# Patient Record
Sex: FEMALE | Race: WHITE | Employment: OTHER | URBAN - METROPOLITAN AREA
[De-identification: names, ages, dates, MRNs, and addresses within clinical notes are randomized per-mention and may not be internally consistent; named-entity substitution may affect disease eponyms.]

---

## 2017-01-01 ENCOUNTER — HOSPICE ADMISSION (OUTPATIENT)
Dept: HOSPICE | Facility: HOSPICE | Age: 82
End: 2017-01-01
Payer: MEDICARE

## 2017-01-01 ENCOUNTER — APPOINTMENT (OUTPATIENT)
Dept: GENERAL RADIOLOGY | Age: 82
DRG: 871 | End: 2017-01-01
Attending: INTERNAL MEDICINE
Payer: MEDICARE

## 2017-01-01 ENCOUNTER — APPOINTMENT (OUTPATIENT)
Dept: GENERAL RADIOLOGY | Age: 82
DRG: 871 | End: 2017-01-01
Attending: PHYSICIAN ASSISTANT
Payer: MEDICARE

## 2017-01-01 ENCOUNTER — APPOINTMENT (OUTPATIENT)
Dept: GENERAL RADIOLOGY | Age: 82
DRG: 871 | End: 2017-01-01
Attending: NURSE PRACTITIONER
Payer: MEDICARE

## 2017-01-01 ENCOUNTER — APPOINTMENT (OUTPATIENT)
Dept: ULTRASOUND IMAGING | Age: 82
DRG: 871 | End: 2017-01-01
Attending: NURSE PRACTITIONER
Payer: MEDICARE

## 2017-01-01 ENCOUNTER — HOSPITAL ENCOUNTER (INPATIENT)
Age: 82
LOS: 6 days | Discharge: HOSPICE | DRG: 871 | End: 2017-01-19
Attending: EMERGENCY MEDICINE | Admitting: INTERNAL MEDICINE
Payer: MEDICARE

## 2017-01-01 ENCOUNTER — HOSPITAL ENCOUNTER (INPATIENT)
Age: 82
LOS: 2 days | End: 2017-01-21
Attending: INTERNAL MEDICINE | Admitting: INTERNAL MEDICINE

## 2017-01-01 ENCOUNTER — APPOINTMENT (OUTPATIENT)
Dept: GENERAL RADIOLOGY | Age: 82
DRG: 871 | End: 2017-01-01
Attending: EMERGENCY MEDICINE
Payer: MEDICARE

## 2017-01-01 VITALS
OXYGEN SATURATION: 96 % | DIASTOLIC BLOOD PRESSURE: 69 MMHG | HEART RATE: 82 BPM | WEIGHT: 151.4 LBS | SYSTOLIC BLOOD PRESSURE: 136 MMHG | TEMPERATURE: 98.8 F | RESPIRATION RATE: 22 BRPM | BODY MASS INDEX: 25.85 KG/M2 | HEIGHT: 64 IN

## 2017-01-01 VITALS
SYSTOLIC BLOOD PRESSURE: 135 MMHG | HEART RATE: 103 BPM | RESPIRATION RATE: 19 BRPM | DIASTOLIC BLOOD PRESSURE: 90 MMHG | TEMPERATURE: 96.6 F

## 2017-01-01 DIAGNOSIS — N17.9 ACUTE RENAL FAILURE, UNSPECIFIED ACUTE RENAL FAILURE TYPE (HCC): ICD-10-CM

## 2017-01-01 DIAGNOSIS — R09.02 HYPOXEMIA: ICD-10-CM

## 2017-01-01 DIAGNOSIS — E87.0 HYPERNATREMIA: ICD-10-CM

## 2017-01-01 DIAGNOSIS — I82.4Y1 ACUTE DEEP VEIN THROMBOSIS (DVT) OF PROXIMAL VEIN OF RIGHT LOWER EXTREMITY (HCC): ICD-10-CM

## 2017-01-01 DIAGNOSIS — J18.9 CAP (COMMUNITY ACQUIRED PNEUMONIA): ICD-10-CM

## 2017-01-01 DIAGNOSIS — I95.9 HYPOTENSION, UNSPECIFIED HYPOTENSION TYPE: ICD-10-CM

## 2017-01-01 DIAGNOSIS — A41.01 SEPSIS DUE TO METHICILLIN SUSCEPTIBLE STAPHYLOCOCCUS AUREUS (HCC): ICD-10-CM

## 2017-01-01 DIAGNOSIS — N39.0 URINARY TRACT INFECTION WITHOUT HEMATURIA, SITE UNSPECIFIED: ICD-10-CM

## 2017-01-01 DIAGNOSIS — R78.81 BACTEREMIA DUE TO GRAM-POSITIVE BACTERIA: ICD-10-CM

## 2017-01-01 DIAGNOSIS — J96.02 ACUTE RESPIRATORY FAILURE WITH HYPOXIA AND HYPERCAPNIA (HCC): ICD-10-CM

## 2017-01-01 DIAGNOSIS — J96.01 ACUTE RESPIRATORY FAILURE WITH HYPOXIA AND HYPERCAPNIA (HCC): ICD-10-CM

## 2017-01-01 DIAGNOSIS — N19 RENAL FAILURE: ICD-10-CM

## 2017-01-01 DIAGNOSIS — J96.01 ACUTE RESPIRATORY FAILURE WITH HYPOXIA (HCC): ICD-10-CM

## 2017-01-01 DIAGNOSIS — J96.01 ACUTE HYPOXEMIC RESPIRATORY FAILURE (HCC): ICD-10-CM

## 2017-01-01 DIAGNOSIS — J96.00 ACUTE RESPIRATORY FAILURE, UNSPECIFIED WHETHER WITH HYPOXIA OR HYPERCAPNIA (HCC): ICD-10-CM

## 2017-01-01 DIAGNOSIS — D64.9 ANEMIA, UNSPECIFIED TYPE: ICD-10-CM

## 2017-01-01 DIAGNOSIS — G93.1 ANOXIC BRAIN INJURY (HCC): ICD-10-CM

## 2017-01-01 DIAGNOSIS — G93.40 ACUTE ENCEPHALOPATHY: ICD-10-CM

## 2017-01-01 DIAGNOSIS — A41.9 SEPTIC SHOCK (HCC): Primary | ICD-10-CM

## 2017-01-01 DIAGNOSIS — R65.21 SEPTIC SHOCK (HCC): Primary | ICD-10-CM

## 2017-01-01 DIAGNOSIS — N30.00 ACUTE CYSTITIS WITHOUT HEMATURIA: ICD-10-CM

## 2017-01-01 LAB
ABO + RH BLD: NORMAL
ALBUMIN SERPL BCP-MCNC: 1.5 G/DL (ref 3.2–4.6)
ALBUMIN SERPL BCP-MCNC: 1.5 G/DL (ref 3.2–4.6)
ALBUMIN SERPL BCP-MCNC: 1.7 G/DL (ref 3.2–4.6)
ALBUMIN SERPL BCP-MCNC: 2 G/DL (ref 3.2–4.6)
ALBUMIN SERPL BCP-MCNC: 2.6 G/DL (ref 3.2–4.6)
ALBUMIN/GLOB SERPL: 0.3 {RATIO} (ref 1.2–3.5)
ALBUMIN/GLOB SERPL: 0.3 {RATIO} (ref 1.2–3.5)
ALBUMIN/GLOB SERPL: 0.4 {RATIO} (ref 1.2–3.5)
ALBUMIN/GLOB SERPL: 0.5 {RATIO} (ref 1.2–3.5)
ALBUMIN/GLOB SERPL: 0.5 {RATIO} (ref 1.2–3.5)
ALP SERPL-CCNC: 107 U/L (ref 50–136)
ALP SERPL-CCNC: 38 U/L (ref 50–136)
ALP SERPL-CCNC: 59 U/L (ref 50–136)
ALP SERPL-CCNC: 64 U/L (ref 50–136)
ALP SERPL-CCNC: 98 U/L (ref 50–136)
ALT SERPL-CCNC: 21 U/L (ref 12–65)
ALT SERPL-CCNC: 24 U/L (ref 12–65)
ALT SERPL-CCNC: 27 U/L (ref 12–65)
ALT SERPL-CCNC: 28 U/L (ref 12–65)
ALT SERPL-CCNC: 29 U/L (ref 12–65)
ANION GAP BLD CALC-SCNC: 14 MMOL/L (ref 7–16)
ANION GAP BLD CALC-SCNC: 14 MMOL/L (ref 7–16)
ANION GAP BLD CALC-SCNC: 15 MMOL/L (ref 7–16)
ANION GAP BLD CALC-SCNC: 16 MMOL/L (ref 7–16)
ANION GAP BLD CALC-SCNC: 17 MMOL/L (ref 7–16)
APPEARANCE UR: ABNORMAL
ARTERIAL PATENCY WRIST A: ABNORMAL
ARTERIAL PATENCY WRIST A: POSITIVE
AST SERPL W P-5'-P-CCNC: 19 U/L (ref 15–37)
AST SERPL W P-5'-P-CCNC: 22 U/L (ref 15–37)
AST SERPL W P-5'-P-CCNC: 27 U/L (ref 15–37)
AST SERPL W P-5'-P-CCNC: 30 U/L (ref 15–37)
AST SERPL W P-5'-P-CCNC: 52 U/L (ref 15–37)
ATRIAL RATE: 140 BPM
BACTERIA SPEC CULT: NORMAL
BACTERIA URNS QL MICRO: ABNORMAL /HPF
BASE DEFICIT BLDA-SCNC: 11.1 MMOL/L (ref 0–2)
BASE DEFICIT BLDA-SCNC: 11.8 MMOL/L (ref 0–2)
BASE DEFICIT BLDA-SCNC: 5.3 MMOL/L (ref 0–2)
BASE DEFICIT BLDA-SCNC: 5.7 MMOL/L (ref 0–2)
BASE DEFICIT BLDA-SCNC: 7.1 MMOL/L (ref 0–2)
BASE DEFICIT BLDA-SCNC: 8.9 MMOL/L (ref 0–2)
BASE DEFICIT BLDA-SCNC: 9.8 MMOL/L (ref 0–2)
BASOPHILS # BLD AUTO: 0 K/UL (ref 0–0.2)
BASOPHILS # BLD: 0 % (ref 0–2)
BDY SITE: ABNORMAL
BILIRUB SERPL-MCNC: 0.3 MG/DL (ref 0.2–1.1)
BILIRUB SERPL-MCNC: 0.4 MG/DL (ref 0.2–1.1)
BILIRUB SERPL-MCNC: 0.5 MG/DL (ref 0.2–1.1)
BILIRUB UR QL: ABNORMAL
BLD PROD TYP BPU: NORMAL
BLOOD GROUP ANTIBODIES SERPL: NORMAL
BPU ID: NORMAL
BUN SERPL-MCNC: 53 MG/DL (ref 8–23)
BUN SERPL-MCNC: 64 MG/DL (ref 8–23)
BUN SERPL-MCNC: 64 MG/DL (ref 8–23)
BUN SERPL-MCNC: 67 MG/DL (ref 8–23)
BUN SERPL-MCNC: 70 MG/DL (ref 8–23)
BUN SERPL-MCNC: 75 MG/DL (ref 8–23)
BUN SERPL-MCNC: 79 MG/DL (ref 8–23)
CA-I BLD-SCNC: 1.23 MMOL/L (ref 1–1.3)
CA-I BLD-SCNC: 1.34 MMOL/L (ref 1–1.3)
CALCIUM SERPL-MCNC: 7.5 MG/DL (ref 8.3–10.4)
CALCIUM SERPL-MCNC: 7.6 MG/DL (ref 8.3–10.4)
CALCIUM SERPL-MCNC: 7.7 MG/DL (ref 8.3–10.4)
CALCIUM SERPL-MCNC: 8 MG/DL (ref 8.3–10.4)
CALCIUM SERPL-MCNC: 8 MG/DL (ref 8.3–10.4)
CALCIUM SERPL-MCNC: 8.2 MG/DL (ref 8.3–10.4)
CALCIUM SERPL-MCNC: 9.3 MG/DL (ref 8.3–10.4)
CALCULATED P AXIS, ECG09: 70 DEGREES
CALCULATED R AXIS, ECG10: -48 DEGREES
CALCULATED T AXIS, ECG11: 69 DEGREES
CASTS URNS QL MICRO: ABNORMAL /LPF
CHLORIDE BLDA-SCNC: 111 MMOL/L (ref 98–106)
CHLORIDE BLDA-SCNC: 113 MMOL/L (ref 98–106)
CHLORIDE SERPL-SCNC: 109 MMOL/L (ref 98–107)
CHLORIDE SERPL-SCNC: 110 MMOL/L (ref 98–107)
CHLORIDE SERPL-SCNC: 114 MMOL/L (ref 98–107)
CHLORIDE SERPL-SCNC: 116 MMOL/L (ref 98–107)
CHLORIDE SERPL-SCNC: 117 MMOL/L (ref 98–107)
CHLORIDE SERPL-SCNC: 121 MMOL/L (ref 98–107)
CHLORIDE SERPL-SCNC: 121 MMOL/L (ref 98–107)
CK SERPL-CCNC: 158 U/L (ref 21–215)
CO2 SERPL-SCNC: 14 MMOL/L (ref 21–32)
CO2 SERPL-SCNC: 17 MMOL/L (ref 21–32)
CO2 SERPL-SCNC: 17 MMOL/L (ref 21–32)
CO2 SERPL-SCNC: 18 MMOL/L (ref 21–32)
CO2 SERPL-SCNC: 19 MMOL/L (ref 21–32)
CO2 SERPL-SCNC: 19 MMOL/L (ref 21–32)
CO2 SERPL-SCNC: 20 MMOL/L (ref 21–32)
COHGB MFR BLD: 0.3 % (ref 0.5–1.5)
COHGB MFR BLD: 0.4 % (ref 0.5–1.5)
COHGB MFR BLD: 0.6 % (ref 0.5–1.5)
COHGB MFR BLD: 0.6 % (ref 0.5–1.5)
COHGB MFR BLD: 0.8 % (ref 0.5–1.5)
COLOR UR: YELLOW
CREAT SERPL-MCNC: 1.76 MG/DL (ref 0.6–1)
CREAT SERPL-MCNC: 2.07 MG/DL (ref 0.6–1)
CREAT SERPL-MCNC: 2.4 MG/DL (ref 0.6–1)
CREAT SERPL-MCNC: 2.52 MG/DL (ref 0.6–1)
CREAT SERPL-MCNC: 2.72 MG/DL (ref 0.6–1)
CREAT SERPL-MCNC: 2.93 MG/DL (ref 0.6–1)
CREAT SERPL-MCNC: 2.97 MG/DL (ref 0.6–1)
CROSSMATCH RESULT,%XM: NORMAL
DIAGNOSIS, 93000: NORMAL
DIASTOLIC BP, ECG02: NORMAL MMHG
DIFFERENTIAL METHOD BLD: ABNORMAL
DO-HGB BLD-MCNC: 1 % (ref 0–5)
DO-HGB BLD-MCNC: 1 % (ref 0–5)
DO-HGB BLD-MCNC: 10 % (ref 0–5)
DO-HGB BLD-MCNC: 2 % (ref 0–5)
DO-HGB BLD-MCNC: 6 % (ref 0–5)
EOSINOPHIL # BLD: 0 K/UL (ref 0–0.8)
EOSINOPHIL NFR BLD: 0 % (ref 0.5–7.8)
EPI CELLS #/AREA URNS HPF: ABNORMAL /HPF
ERYTHROCYTE [DISTWIDTH] IN BLOOD BY AUTOMATED COUNT: 15.7 % (ref 11.9–14.6)
ERYTHROCYTE [DISTWIDTH] IN BLOOD BY AUTOMATED COUNT: 15.7 % (ref 11.9–14.6)
ERYTHROCYTE [DISTWIDTH] IN BLOOD BY AUTOMATED COUNT: 15.8 % (ref 11.9–14.6)
ERYTHROCYTE [DISTWIDTH] IN BLOOD BY AUTOMATED COUNT: 17 % (ref 11.9–14.6)
ERYTHROCYTE [DISTWIDTH] IN BLOOD BY AUTOMATED COUNT: 17.1 % (ref 11.9–14.6)
ERYTHROCYTE [DISTWIDTH] IN BLOOD BY AUTOMATED COUNT: 17.1 % (ref 11.9–14.6)
FIO2 ON VENT: 65 %
GAS FLOW.O2 O2 DELIVERY SYS: 8 L/MIN
GLOBULIN SER CALC-MCNC: 4 G/DL (ref 2.3–3.5)
GLOBULIN SER CALC-MCNC: 4.1 G/DL (ref 2.3–3.5)
GLOBULIN SER CALC-MCNC: 4.3 G/DL (ref 2.3–3.5)
GLOBULIN SER CALC-MCNC: 4.6 G/DL (ref 2.3–3.5)
GLOBULIN SER CALC-MCNC: 4.9 G/DL (ref 2.3–3.5)
GLUCOSE SERPL-MCNC: 102 MG/DL (ref 65–100)
GLUCOSE SERPL-MCNC: 112 MG/DL (ref 65–100)
GLUCOSE SERPL-MCNC: 122 MG/DL (ref 65–100)
GLUCOSE SERPL-MCNC: 223 MG/DL (ref 65–100)
GLUCOSE SERPL-MCNC: 234 MG/DL (ref 65–100)
GLUCOSE SERPL-MCNC: 76 MG/DL (ref 65–100)
GLUCOSE SERPL-MCNC: 96 MG/DL (ref 65–100)
GLUCOSE UR STRIP.AUTO-MCNC: NEGATIVE MG/DL
GRAM STN SPEC: NORMAL
HCO3 BLDA-SCNC: 12 MMOL/L (ref 22–26)
HCO3 BLDA-SCNC: 14 MMOL/L (ref 22–26)
HCO3 BLDA-SCNC: 16 MMOL/L (ref 22–26)
HCO3 BLDA-SCNC: 17 MMOL/L (ref 22–26)
HCO3 BLDA-SCNC: 18 MMOL/L (ref 22–26)
HCT VFR BLD AUTO: 24.7 % (ref 35.8–46.3)
HCT VFR BLD AUTO: 27.1 % (ref 35.8–46.3)
HCT VFR BLD AUTO: 28.3 % (ref 35.8–46.3)
HCT VFR BLD AUTO: 29.8 % (ref 35.8–46.3)
HCT VFR BLD AUTO: 31.3 % (ref 35.8–46.3)
HCT VFR BLD AUTO: 34.3 % (ref 35.8–46.3)
HGB BLD-MCNC: 10.5 G/DL (ref 11.7–15.4)
HGB BLD-MCNC: 10.5 G/DL (ref 11.7–15.4)
HGB BLD-MCNC: 7.6 G/DL (ref 11.7–15.4)
HGB BLD-MCNC: 8.7 G/DL (ref 11.7–15.4)
HGB BLD-MCNC: 8.7 G/DL (ref 11.7–15.4)
HGB BLD-MCNC: 9.9 G/DL (ref 11.7–15.4)
HGB BLDMV-MCNC: 10.3 GM/DL (ref 11.7–15)
HGB BLDMV-MCNC: 10.5 GM/DL (ref 11.7–15)
HGB BLDMV-MCNC: 11.9 GM/DL (ref 11.7–15)
HGB BLDMV-MCNC: 13.2 GM/DL (ref 11.7–15)
HGB BLDMV-MCNC: 8 GM/DL (ref 11.7–15)
HGB BLDMV-MCNC: 8.8 GM/DL (ref 11.7–15)
HGB BLDMV-MCNC: 9.8 GM/DL (ref 11.7–15)
HGB UR QL STRIP: ABNORMAL
IMM GRANULOCYTES # BLD: 0.2 K/UL (ref 0–0.5)
IMM GRANULOCYTES NFR BLD AUTO: 0.9 % (ref 0–5)
INR PPP: 1 (ref 0.9–1.2)
KETONES UR QL STRIP.AUTO: ABNORMAL MG/DL
LACTATE SERPL-SCNC: 1.7 MMOL/L (ref 0.4–2)
LACTATE SERPL-SCNC: 2.2 MMOL/L (ref 0.4–2)
LACTATE SERPL-SCNC: 2.2 MMOL/L (ref 0.4–2)
LACTATE SERPL-SCNC: 4.2 MMOL/L (ref 0.4–2)
LEUKOCYTE ESTERASE UR QL STRIP.AUTO: NEGATIVE
LYMPHOCYTES # BLD AUTO: 5 % (ref 13–44)
LYMPHOCYTES # BLD AUTO: 5 % (ref 13–44)
LYMPHOCYTES # BLD AUTO: 6 % (ref 13–44)
LYMPHOCYTES # BLD: 0.8 K/UL (ref 0.5–4.6)
LYMPHOCYTES # BLD: 0.9 K/UL (ref 0.5–4.6)
LYMPHOCYTES # BLD: 1 K/UL (ref 0.5–4.6)
LYMPHOCYTES # BLD: 1.1 K/UL (ref 0.5–4.6)
LYMPHOCYTES # BLD: 1.4 K/UL (ref 0.5–4.6)
LYMPHOCYTES # BLD: 3.3 K/UL (ref 0.5–4.6)
LYMPHOCYTES NFR BLD MANUAL: 23 % (ref 16–44)
LYMPHOCYTES NFR BLD MANUAL: 7 % (ref 16–44)
LYMPHOCYTES NFR BLD MANUAL: 7 % (ref 16–44)
MAGNESIUM SERPL-MCNC: 2 MG/DL (ref 1.8–2.4)
MAGNESIUM SERPL-MCNC: 2.1 MG/DL (ref 1.8–2.4)
MAGNESIUM SERPL-MCNC: 2.1 MG/DL (ref 1.8–2.4)
MAGNESIUM SERPL-MCNC: 2.2 MG/DL (ref 1.8–2.4)
MAGNESIUM SERPL-MCNC: 2.3 MG/DL (ref 1.8–2.4)
MAGNESIUM SERPL-MCNC: 2.8 MG/DL (ref 1.8–2.4)
MCH RBC QN AUTO: 23.2 PG (ref 26.1–32.9)
MCH RBC QN AUTO: 23.5 PG (ref 26.1–32.9)
MCH RBC QN AUTO: 23.6 PG (ref 26.1–32.9)
MCH RBC QN AUTO: 24.6 PG (ref 26.1–32.9)
MCH RBC QN AUTO: 25 PG (ref 26.1–32.9)
MCH RBC QN AUTO: 25.1 PG (ref 26.1–32.9)
MCHC RBC AUTO-ENTMCNC: 30.6 G/DL (ref 31.4–35)
MCHC RBC AUTO-ENTMCNC: 30.7 G/DL (ref 31.4–35)
MCHC RBC AUTO-ENTMCNC: 30.8 G/DL (ref 31.4–35)
MCHC RBC AUTO-ENTMCNC: 32.1 G/DL (ref 31.4–35)
MCHC RBC AUTO-ENTMCNC: 33.2 G/DL (ref 31.4–35)
MCHC RBC AUTO-ENTMCNC: 33.5 G/DL (ref 31.4–35)
MCV RBC AUTO: 74.7 FL (ref 79.6–97.8)
MCV RBC AUTO: 75.3 FL (ref 79.6–97.8)
MCV RBC AUTO: 75.3 FL (ref 79.6–97.8)
MCV RBC AUTO: 76.5 FL (ref 79.6–97.8)
MCV RBC AUTO: 76.8 FL (ref 79.6–97.8)
MCV RBC AUTO: 77.1 FL (ref 79.6–97.8)
METAMYELOCYTES NFR BLD MANUAL: 6 %
METHGB MFR BLD: 0.1 % (ref 0–1.5)
METHGB MFR BLD: 0.3 % (ref 0–1.5)
METHGB MFR BLD: 0.4 % (ref 0–1.5)
METHGB MFR BLD: 0.4 % (ref 0–1.5)
METHGB MFR BLD: 0.5 % (ref 0–1.5)
METHGB MFR BLD: 0.5 % (ref 0–1.5)
METHGB MFR BLD: 0.6 % (ref 0–1.5)
MONOCYTES # BLD: 0.2 K/UL (ref 0.1–1.3)
MONOCYTES # BLD: 0.3 K/UL (ref 0.1–1.3)
MONOCYTES # BLD: 0.4 K/UL (ref 0.1–1.3)
MONOCYTES # BLD: 0.7 K/UL (ref 0.1–1.3)
MONOCYTES # BLD: 0.7 K/UL (ref 0.1–1.3)
MONOCYTES # BLD: 0.9 K/UL (ref 0.1–1.3)
MONOCYTES NFR BLD AUTO: 2 % (ref 4–12)
MONOCYTES NFR BLD AUTO: 4 % (ref 4–12)
MONOCYTES NFR BLD AUTO: 4 % (ref 4–12)
MONOCYTES NFR BLD MANUAL: 1 % (ref 3–9)
MONOCYTES NFR BLD MANUAL: 2 % (ref 3–9)
MONOCYTES NFR BLD MANUAL: 6 % (ref 3–9)
MYELOCYTES NFR BLD MANUAL: 3 %
NEUTS BAND NFR BLD MANUAL: 32 % (ref 0–10)
NEUTS BAND NFR BLD MANUAL: 8 % (ref 0–10)
NEUTS BAND NFR BLD MANUAL: 9 % (ref 0–10)
NEUTS SEG # BLD: 10.8 K/UL (ref 1.7–8.2)
NEUTS SEG # BLD: 13 K/UL (ref 1.7–8.2)
NEUTS SEG # BLD: 16.2 K/UL (ref 1.7–8.2)
NEUTS SEG # BLD: 16.5 K/UL (ref 1.7–8.2)
NEUTS SEG # BLD: 17.5 K/UL (ref 1.7–8.2)
NEUTS SEG # BLD: 18.4 K/UL (ref 1.7–8.2)
NEUTS SEG NFR BLD AUTO: 90 % (ref 43–78)
NEUTS SEG NFR BLD AUTO: 91 % (ref 43–78)
NEUTS SEG NFR BLD AUTO: 92 % (ref 43–78)
NEUTS SEG NFR BLD MANUAL: 57 % (ref 47–75)
NEUTS SEG NFR BLD MANUAL: 60 % (ref 47–75)
NEUTS SEG NFR BLD MANUAL: 78 % (ref 47–75)
NITRITE UR QL STRIP.AUTO: POSITIVE
OXYHGB MFR BLDA: 89.6 % (ref 94–97)
OXYHGB MFR BLDA: 92.9 % (ref 94–97)
OXYHGB MFR BLDA: 97.1 % (ref 94–97)
OXYHGB MFR BLDA: 97.2 % (ref 94–97)
OXYHGB MFR BLDA: 97.8 % (ref 94–97)
OXYHGB MFR BLDA: 98 % (ref 94–97)
OXYHGB MFR BLDA: 98.4 % (ref 94–97)
P-R INTERVAL, ECG05: 114 MS
PCO2 BLDA: 20 MMHG (ref 35–45)
PCO2 BLDA: 24 MMHG (ref 35–45)
PCO2 BLDA: 24 MMHG (ref 35–45)
PCO2 BLDA: 26 MMHG (ref 35–45)
PCO2 BLDA: 27 MMHG (ref 35–45)
PCO2 BLDA: 46 MMHG (ref 35–45)
PCO2 BLDA: 51 MMHG (ref 35–45)
PEEP RESPIRATORY: 8 CM[H2O]
PH BLDA: 7.14 [PH] (ref 7.35–7.45)
PH BLDA: 7.22 [PH] (ref 7.35–7.45)
PH BLDA: 7.38 [PH] (ref 7.35–7.45)
PH BLDA: 7.4 [PH] (ref 7.35–7.45)
PH BLDA: 7.43 [PH] (ref 7.35–7.45)
PH BLDA: 7.44 [PH] (ref 7.35–7.45)
PH BLDA: 7.44 [PH] (ref 7.35–7.45)
PH UR STRIP: 5 [PH] (ref 5–9)
PHOSPHATE SERPL-MCNC: 2 MG/DL (ref 2.3–3.7)
PHOSPHATE SERPL-MCNC: 2.1 MG/DL (ref 2.3–3.7)
PHOSPHATE SERPL-MCNC: 2.8 MG/DL (ref 2.3–3.7)
PHOSPHATE SERPL-MCNC: 2.8 MG/DL (ref 2.3–3.7)
PHOSPHATE SERPL-MCNC: 4.2 MG/DL (ref 2.3–3.7)
PLATELET # BLD AUTO: 132 K/UL (ref 150–450)
PLATELET # BLD AUTO: 159 K/UL (ref 150–450)
PLATELET # BLD AUTO: 229 K/UL (ref 150–450)
PLATELET # BLD AUTO: 254 K/UL (ref 150–450)
PLATELET # BLD AUTO: 282 K/UL (ref 150–450)
PLATELET # BLD AUTO: 370 K/UL (ref 150–450)
PLATELET COMMENTS,PCOM: ADEQUATE
PMV BLD AUTO: 11 FL (ref 10.8–14.1)
PMV BLD AUTO: 11 FL (ref 10.8–14.1)
PMV BLD AUTO: 11.4 FL (ref 10.8–14.1)
PMV BLD AUTO: 11.6 FL (ref 10.8–14.1)
PMV BLD AUTO: ABNORMAL FL (ref 10.8–14.1)
PMV BLD AUTO: ABNORMAL FL (ref 10.8–14.1)
PO2 BLDA: 106 MMHG (ref 75–100)
PO2 BLDA: 113 MMHG (ref 75–100)
PO2 BLDA: 129 MMHG (ref 75–100)
PO2 BLDA: 139 MMHG (ref 75–100)
PO2 BLDA: 151 MMHG (ref 75–100)
PO2 BLDA: 68 MMHG (ref 75–100)
PO2 BLDA: 78 MMHG (ref 75–100)
POTASSIUM BLDA-SCNC: 2.6 MMOL/L (ref 3.5–5.3)
POTASSIUM BLDA-SCNC: 4.41 MMOL/L (ref 3.5–5.3)
POTASSIUM SERPL-SCNC: 3 MMOL/L (ref 3.5–5.1)
POTASSIUM SERPL-SCNC: 3.1 MMOL/L (ref 3.5–5.1)
POTASSIUM SERPL-SCNC: 3.6 MMOL/L (ref 3.5–5.1)
POTASSIUM SERPL-SCNC: 3.9 MMOL/L (ref 3.5–5.1)
POTASSIUM SERPL-SCNC: 4.4 MMOL/L (ref 3.5–5.1)
PROCALCITONIN SERPL-MCNC: 6.3 NG/ML
PROMYELOCYTES NFR BLD MANUAL: 1 %
PROT SERPL-MCNC: 5.8 G/DL (ref 6.3–8.2)
PROT SERPL-MCNC: 5.8 G/DL (ref 6.3–8.2)
PROT SERPL-MCNC: 6 G/DL (ref 6.3–8.2)
PROT SERPL-MCNC: 6.1 G/DL (ref 6.3–8.2)
PROT SERPL-MCNC: 7.5 G/DL (ref 6.3–8.2)
PROT UR STRIP-MCNC: 100 MG/DL
PROTHROMBIN TIME: 10.7 SEC (ref 9.6–12)
Q-T INTERVAL, ECG07: 298 MS
QRS DURATION, ECG06: 88 MS
QTC CALCULATION (BEZET), ECG08: 454 MS
RBC # BLD AUTO: 3.28 M/UL (ref 4.05–5.25)
RBC # BLD AUTO: 3.53 M/UL (ref 4.05–5.25)
RBC # BLD AUTO: 3.7 M/UL (ref 4.05–5.25)
RBC # BLD AUTO: 3.96 M/UL (ref 4.05–5.25)
RBC # BLD AUTO: 4.19 M/UL (ref 4.05–5.25)
RBC # BLD AUTO: 4.45 M/UL (ref 4.05–5.25)
RBC #/AREA URNS HPF: ABNORMAL /HPF
RBC MORPH BLD: ABNORMAL
RESP RATE: 12
RESP RATE: 15
RESP RATE: 20
SAO2 % BLD: 90 % (ref 92–98.5)
SAO2 % BLD: 94 % (ref 92–98.5)
SAO2 % BLD: 98 % (ref 92–98.5)
SAO2 % BLD: 98 % (ref 92–98.5)
SAO2 % BLD: 99 % (ref 92–98.5)
SERVICE CMNT-IMP: ABNORMAL
SERVICE CMNT-IMP: NORMAL
SODIUM BLDA-SCNC: 140.2 MMOL/L (ref 135–148)
SODIUM BLDA-SCNC: 150.1 MMOL/L (ref 135–148)
SODIUM SERPL-SCNC: 142 MMOL/L (ref 136–145)
SODIUM SERPL-SCNC: 144 MMOL/L (ref 136–145)
SODIUM SERPL-SCNC: 148 MMOL/L (ref 136–145)
SODIUM SERPL-SCNC: 149 MMOL/L (ref 136–145)
SODIUM SERPL-SCNC: 150 MMOL/L (ref 136–145)
SODIUM SERPL-SCNC: 152 MMOL/L (ref 136–145)
SODIUM SERPL-SCNC: 153 MMOL/L (ref 136–145)
SP GR UR REFRACTOMETRY: 1.02 (ref 1–1.02)
SPECIMEN EXP DATE BLD: NORMAL
STATUS OF UNIT,%ST: NORMAL
SYSTOLIC BP, ECG01: NORMAL MMHG
TROPONIN I BLD-MCNC: 0.07 NG/ML (ref 0–0.08)
UNIT DIVISION, %UDIV: 0
UROBILINOGEN UR QL STRIP.AUTO: 0.2 EU/DL (ref 0.2–1)
VANCOMYCIN SERPL-MCNC: 12 UG/ML
VANCOMYCIN SERPL-MCNC: 7.5 UG/ML
VENTILATION MODE VENT: ABNORMAL
VENTRICULAR RATE, ECG03: 140 BPM
VT SETTING VENT: 400 ML
VT SETTING VENT: 523 ML
WBC # BLD AUTO: 14.4 K/UL (ref 4.3–11.1)
WBC # BLD AUTO: 14.9 K/UL (ref 4.3–11.1)
WBC # BLD AUTO: 17.9 K/UL (ref 4.3–11.1)
WBC # BLD AUTO: 18.1 K/UL (ref 4.3–11.1)
WBC # BLD AUTO: 19 K/UL (ref 4.3–11.1)
WBC # BLD AUTO: 20 K/UL (ref 4.3–11.1)
WBC MORPH BLD: ABNORMAL
WBC URNS QL MICRO: ABNORMAL /HPF

## 2017-01-01 PROCEDURE — 80053 COMPREHEN METABOLIC PANEL: CPT | Performed by: NURSE PRACTITIONER

## 2017-01-01 PROCEDURE — 77030019605

## 2017-01-01 PROCEDURE — 84145 PROCALCITONIN (PCT): CPT | Performed by: EMERGENCY MEDICINE

## 2017-01-01 PROCEDURE — 74011000302 HC RX REV CODE- 302: Performed by: INTERNAL MEDICINE

## 2017-01-01 PROCEDURE — 31500 INSERT EMERGENCY AIRWAY: CPT | Performed by: EMERGENCY MEDICINE

## 2017-01-01 PROCEDURE — 83735 ASSAY OF MAGNESIUM: CPT | Performed by: NURSE PRACTITIONER

## 2017-01-01 PROCEDURE — 74011250636 HC RX REV CODE- 250/636: Performed by: INTERNAL MEDICINE

## 2017-01-01 PROCEDURE — 85025 COMPLETE CBC W/AUTO DIFF WBC: CPT | Performed by: NURSE PRACTITIONER

## 2017-01-01 PROCEDURE — 65270000029 HC RM PRIVATE

## 2017-01-01 PROCEDURE — 83605 ASSAY OF LACTIC ACID: CPT | Performed by: INTERNAL MEDICINE

## 2017-01-01 PROCEDURE — 99233 SBSQ HOSP IP/OBS HIGH 50: CPT | Performed by: INTERNAL MEDICINE

## 2017-01-01 PROCEDURE — 82803 BLOOD GASES ANY COMBINATION: CPT

## 2017-01-01 PROCEDURE — 87086 URINE CULTURE/COLONY COUNT: CPT | Performed by: EMERGENCY MEDICINE

## 2017-01-01 PROCEDURE — 0BH17EZ INSERTION OF ENDOTRACHEAL AIRWAY INTO TRACHEA, VIA NATURAL OR ARTIFICIAL OPENING: ICD-10-PCS | Performed by: EMERGENCY MEDICINE

## 2017-01-01 PROCEDURE — 74011000258 HC RX REV CODE- 258: Performed by: INTERNAL MEDICINE

## 2017-01-01 PROCEDURE — 99232 SBSQ HOSP IP/OBS MODERATE 35: CPT | Performed by: INTERNAL MEDICINE

## 2017-01-01 PROCEDURE — 74011250636 HC RX REV CODE- 250/636: Performed by: NURSE PRACTITIONER

## 2017-01-01 PROCEDURE — 86923 COMPATIBILITY TEST ELECTRIC: CPT | Performed by: INTERNAL MEDICINE

## 2017-01-01 PROCEDURE — 71010 XR CHEST PORT: CPT

## 2017-01-01 PROCEDURE — 36415 COLL VENOUS BLD VENIPUNCTURE: CPT | Performed by: NURSE PRACTITIONER

## 2017-01-01 PROCEDURE — 65620000000 HC RM CCU GENERAL

## 2017-01-01 PROCEDURE — 87088 URINE BACTERIA CULTURE: CPT | Performed by: EMERGENCY MEDICINE

## 2017-01-01 PROCEDURE — 5A1945Z RESPIRATORY VENTILATION, 24-96 CONSECUTIVE HOURS: ICD-10-PCS | Performed by: EMERGENCY MEDICINE

## 2017-01-01 PROCEDURE — 87186 SC STD MICRODIL/AGAR DIL: CPT | Performed by: NURSE PRACTITIONER

## 2017-01-01 PROCEDURE — 74011000250 HC RX REV CODE- 250: Performed by: INTERNAL MEDICINE

## 2017-01-01 PROCEDURE — 85025 COMPLETE CBC W/AUTO DIFF WBC: CPT | Performed by: INTERNAL MEDICINE

## 2017-01-01 PROCEDURE — 36600 WITHDRAWAL OF ARTERIAL BLOOD: CPT

## 2017-01-01 PROCEDURE — 87205 SMEAR GRAM STAIN: CPT | Performed by: NURSE PRACTITIONER

## 2017-01-01 PROCEDURE — 0656 HSPC GENERAL INPATIENT

## 2017-01-01 PROCEDURE — 85025 COMPLETE CBC W/AUTO DIFF WBC: CPT | Performed by: EMERGENCY MEDICINE

## 2017-01-01 PROCEDURE — 84100 ASSAY OF PHOSPHORUS: CPT | Performed by: NURSE PRACTITIONER

## 2017-01-01 PROCEDURE — 76450000000

## 2017-01-01 PROCEDURE — 80053 COMPREHEN METABOLIC PANEL: CPT | Performed by: EMERGENCY MEDICINE

## 2017-01-01 PROCEDURE — 96375 TX/PRO/DX INJ NEW DRUG ADDON: CPT | Performed by: EMERGENCY MEDICINE

## 2017-01-01 PROCEDURE — 99222 1ST HOSP IP/OBS MODERATE 55: CPT | Performed by: INTERNAL MEDICINE

## 2017-01-01 PROCEDURE — 87641 MR-STAPH DNA AMP PROBE: CPT | Performed by: INTERNAL MEDICINE

## 2017-01-01 PROCEDURE — 94003 VENT MGMT INPAT SUBQ DAY: CPT

## 2017-01-01 PROCEDURE — 96367 TX/PROPH/DG ADDL SEQ IV INF: CPT | Performed by: EMERGENCY MEDICINE

## 2017-01-01 PROCEDURE — 99239 HOSP IP/OBS DSCHRG MGMT >30: CPT | Performed by: INTERNAL MEDICINE

## 2017-01-01 PROCEDURE — 99285 EMERGENCY DEPT VISIT HI MDM: CPT | Performed by: EMERGENCY MEDICINE

## 2017-01-01 PROCEDURE — 94760 N-INVAS EAR/PLS OXIMETRY 1: CPT

## 2017-01-01 PROCEDURE — 86580 TB INTRADERMAL TEST: CPT | Performed by: INTERNAL MEDICINE

## 2017-01-01 PROCEDURE — 93005 ELECTROCARDIOGRAM TRACING: CPT | Performed by: EMERGENCY MEDICINE

## 2017-01-01 PROCEDURE — C9113 INJ PANTOPRAZOLE SODIUM, VIA: HCPCS | Performed by: INTERNAL MEDICINE

## 2017-01-01 PROCEDURE — 74011000250 HC RX REV CODE- 250: Performed by: NURSE PRACTITIONER

## 2017-01-01 PROCEDURE — 74011250637 HC RX REV CODE- 250/637: Performed by: NURSE PRACTITIONER

## 2017-01-01 PROCEDURE — 94002 VENT MGMT INPAT INIT DAY: CPT

## 2017-01-01 PROCEDURE — L4396 STATIC OR DYNAMI AFO PRE CST: HCPCS

## 2017-01-01 PROCEDURE — 96368 THER/DIAG CONCURRENT INF: CPT | Performed by: EMERGENCY MEDICINE

## 2017-01-01 PROCEDURE — 87186 SC STD MICRODIL/AGAR DIL: CPT | Performed by: EMERGENCY MEDICINE

## 2017-01-01 PROCEDURE — 96366 THER/PROPH/DIAG IV INF ADDON: CPT | Performed by: EMERGENCY MEDICINE

## 2017-01-01 PROCEDURE — 81001 URINALYSIS AUTO W/SCOPE: CPT | Performed by: EMERGENCY MEDICINE

## 2017-01-01 PROCEDURE — 80048 BASIC METABOLIC PNL TOTAL CA: CPT | Performed by: INTERNAL MEDICINE

## 2017-01-01 PROCEDURE — 87804 INFLUENZA ASSAY W/OPTIC: CPT | Performed by: EMERGENCY MEDICINE

## 2017-01-01 PROCEDURE — 83735 ASSAY OF MAGNESIUM: CPT | Performed by: EMERGENCY MEDICINE

## 2017-01-01 PROCEDURE — 84484 ASSAY OF TROPONIN QUANT: CPT

## 2017-01-01 PROCEDURE — 96365 THER/PROPH/DIAG IV INF INIT: CPT | Performed by: EMERGENCY MEDICINE

## 2017-01-01 PROCEDURE — 87040 BLOOD CULTURE FOR BACTERIA: CPT | Performed by: INTERNAL MEDICINE

## 2017-01-01 PROCEDURE — 74011250636 HC RX REV CODE- 250/636

## 2017-01-01 PROCEDURE — 82550 ASSAY OF CK (CPK): CPT | Performed by: EMERGENCY MEDICINE

## 2017-01-01 PROCEDURE — 80048 BASIC METABOLIC PNL TOTAL CA: CPT | Performed by: NURSE PRACTITIONER

## 2017-01-01 PROCEDURE — 80202 ASSAY OF VANCOMYCIN: CPT | Performed by: NURSE PRACTITIONER

## 2017-01-01 PROCEDURE — 83605 ASSAY OF LACTIC ACID: CPT | Performed by: NURSE PRACTITIONER

## 2017-01-01 PROCEDURE — 99223 1ST HOSP IP/OBS HIGH 75: CPT | Performed by: INTERNAL MEDICINE

## 2017-01-01 PROCEDURE — 87040 BLOOD CULTURE FOR BACTERIA: CPT | Performed by: NURSE PRACTITIONER

## 2017-01-01 PROCEDURE — 77030013131 HC IV BLD ST ICUM -A

## 2017-01-01 PROCEDURE — 74011250637 HC RX REV CODE- 250/637: Performed by: INTERNAL MEDICINE

## 2017-01-01 PROCEDURE — 93970 EXTREMITY STUDY: CPT

## 2017-01-01 PROCEDURE — P9016 RBC LEUKOCYTES REDUCED: HCPCS | Performed by: INTERNAL MEDICINE

## 2017-01-01 PROCEDURE — 3336500001 HSPC ELECTION

## 2017-01-01 PROCEDURE — 74011000258 HC RX REV CODE- 258: Performed by: EMERGENCY MEDICINE

## 2017-01-01 PROCEDURE — 36430 TRANSFUSION BLD/BLD COMPNT: CPT

## 2017-01-01 PROCEDURE — 74011250636 HC RX REV CODE- 250/636: Performed by: EMERGENCY MEDICINE

## 2017-01-01 PROCEDURE — 87641 MR-STAPH DNA AMP PROBE: CPT | Performed by: NURSE PRACTITIONER

## 2017-01-01 PROCEDURE — 77030008771 HC TU NG SALEM SUMP -A

## 2017-01-01 PROCEDURE — 74011000250 HC RX REV CODE- 250: Performed by: EMERGENCY MEDICINE

## 2017-01-01 PROCEDURE — 87040 BLOOD CULTURE FOR BACTERIA: CPT | Performed by: EMERGENCY MEDICINE

## 2017-01-01 PROCEDURE — 87070 CULTURE OTHR SPECIMN AEROBIC: CPT | Performed by: NURSE PRACTITIONER

## 2017-01-01 PROCEDURE — 74011000258 HC RX REV CODE- 258: Performed by: NURSE PRACTITIONER

## 2017-01-01 PROCEDURE — 77010033678 HC OXYGEN DAILY

## 2017-01-01 PROCEDURE — 86900 BLOOD TYPING SEROLOGIC ABO: CPT | Performed by: INTERNAL MEDICINE

## 2017-01-01 PROCEDURE — 80202 ASSAY OF VANCOMYCIN: CPT | Performed by: INTERNAL MEDICINE

## 2017-01-01 PROCEDURE — 85610 PROTHROMBIN TIME: CPT | Performed by: EMERGENCY MEDICINE

## 2017-01-01 PROCEDURE — 87077 CULTURE AEROBIC IDENTIFY: CPT | Performed by: NURSE PRACTITIONER

## 2017-01-01 RX ORDER — HYOSCYAMINE SULFATE 0.12 MG/ML
250 LIQUID ORAL
Status: DISCONTINUED | OUTPATIENT
Start: 2017-01-01 | End: 2017-01-01 | Stop reason: HOSPADM

## 2017-01-01 RX ORDER — LOPERAMIDE HYDROCHLORIDE 2 MG/1
4 CAPSULE ORAL AS NEEDED
Status: DISCONTINUED | OUTPATIENT
Start: 2017-01-01 | End: 2017-01-01 | Stop reason: HOSPADM

## 2017-01-01 RX ORDER — DEXTROSE MONOHYDRATE 50 MG/ML
50 INJECTION, SOLUTION INTRAVENOUS CONTINUOUS
Status: DISCONTINUED | OUTPATIENT
Start: 2017-01-01 | End: 2017-01-01

## 2017-01-01 RX ORDER — SODIUM CHLORIDE 450 MG/100ML
50 INJECTION, SOLUTION INTRAVENOUS CONTINUOUS
Status: DISCONTINUED | OUTPATIENT
Start: 2017-01-01 | End: 2017-01-01

## 2017-01-01 RX ORDER — SODIUM CHLORIDE 0.9 % (FLUSH) 0.9 %
5-10 SYRINGE (ML) INJECTION EVERY 8 HOURS
Status: DISCONTINUED | OUTPATIENT
Start: 2017-01-01 | End: 2017-01-01

## 2017-01-01 RX ORDER — LIDOCAINE HYDROCHLORIDE 20 MG/ML
5 SOLUTION OROPHARYNGEAL
Status: DISCONTINUED | OUTPATIENT
Start: 2017-01-01 | End: 2017-01-01 | Stop reason: HOSPADM

## 2017-01-01 RX ORDER — SODIUM CHLORIDE 0.9 % (FLUSH) 0.9 %
5-10 SYRINGE (ML) INJECTION AS NEEDED
Status: DISCONTINUED | OUTPATIENT
Start: 2017-01-01 | End: 2017-01-01 | Stop reason: HOSPADM

## 2017-01-01 RX ORDER — HYDROCORTISONE SODIUM SUCCINATE 100 MG/2ML
50 INJECTION, POWDER, FOR SOLUTION INTRAMUSCULAR; INTRAVENOUS EVERY 12 HOURS
Status: DISCONTINUED | OUTPATIENT
Start: 2017-01-01 | End: 2017-01-01

## 2017-01-01 RX ORDER — ENOXAPARIN SODIUM 100 MG/ML
40 INJECTION SUBCUTANEOUS EVERY 24 HOURS
Status: DISCONTINUED | OUTPATIENT
Start: 2017-01-01 | End: 2017-01-01

## 2017-01-01 RX ORDER — ACETAMINOPHEN 325 MG/1
650 TABLET ORAL
Status: DISCONTINUED | OUTPATIENT
Start: 2017-01-01 | End: 2017-01-01 | Stop reason: HOSPADM

## 2017-01-01 RX ORDER — SENNOSIDES 8.6 MG/1
1 TABLET ORAL 2 TIMES DAILY
Status: DISCONTINUED | OUTPATIENT
Start: 2017-01-01 | End: 2017-01-01 | Stop reason: HOSPADM

## 2017-01-01 RX ORDER — HYDROCORTISONE SODIUM SUCCINATE 100 MG/2ML
50 INJECTION, POWDER, FOR SOLUTION INTRAMUSCULAR; INTRAVENOUS EVERY 8 HOURS
Status: DISCONTINUED | OUTPATIENT
Start: 2017-01-01 | End: 2017-01-01

## 2017-01-01 RX ORDER — PROPOFOL 10 MG/ML
5-50 VIAL (ML) INTRAVENOUS
Status: DISCONTINUED | OUTPATIENT
Start: 2017-01-01 | End: 2017-01-01

## 2017-01-01 RX ORDER — POTASSIUM CHLORIDE 14.9 MG/ML
20 INJECTION INTRAVENOUS
Status: COMPLETED | OUTPATIENT
Start: 2017-01-01 | End: 2017-01-01

## 2017-01-01 RX ORDER — MORPHINE SULFATE 100 MG/5ML
10 SOLUTION ORAL
Status: DISCONTINUED | OUTPATIENT
Start: 2017-01-01 | End: 2017-01-01 | Stop reason: HOSPADM

## 2017-01-01 RX ORDER — MORPHINE SULFATE 2 MG/ML
2 INJECTION, SOLUTION INTRAMUSCULAR; INTRAVENOUS
Status: COMPLETED | OUTPATIENT
Start: 2017-01-01 | End: 2017-01-01

## 2017-01-01 RX ORDER — ACETAMINOPHEN 650 MG/1
650 SUPPOSITORY RECTAL
Status: DISCONTINUED | OUTPATIENT
Start: 2017-01-01 | End: 2017-01-01 | Stop reason: HOSPADM

## 2017-01-01 RX ORDER — SUCCINYLCHOLINE CHLORIDE 20 MG/ML
40 INJECTION INTRAMUSCULAR; INTRAVENOUS
Status: COMPLETED | OUTPATIENT
Start: 2017-01-01 | End: 2017-01-01

## 2017-01-01 RX ORDER — GLYCOPYRROLATE 0.2 MG/ML
0.2 INJECTION INTRAMUSCULAR; INTRAVENOUS
Status: DISCONTINUED | OUTPATIENT
Start: 2017-01-01 | End: 2017-01-01 | Stop reason: HOSPADM

## 2017-01-01 RX ORDER — MORPHINE SULFATE 10 MG/ML
4 INJECTION, SOLUTION INTRAMUSCULAR; INTRAVENOUS
Status: DISCONTINUED | OUTPATIENT
Start: 2017-01-01 | End: 2017-01-01 | Stop reason: HOSPADM

## 2017-01-01 RX ORDER — SODIUM BICARBONATE 1 MEQ/ML
100 SYRINGE (ML) INTRAVENOUS ONCE
Status: COMPLETED | OUTPATIENT
Start: 2017-01-01 | End: 2017-01-01

## 2017-01-01 RX ORDER — HYDROCORTISONE SODIUM SUCCINATE 100 MG/2ML
50 INJECTION, POWDER, FOR SOLUTION INTRAMUSCULAR; INTRAVENOUS
Status: COMPLETED | OUTPATIENT
Start: 2017-01-01 | End: 2017-01-01

## 2017-01-01 RX ORDER — LORAZEPAM 2 MG/ML
2 INJECTION INTRAMUSCULAR
Status: DISCONTINUED | OUTPATIENT
Start: 2017-01-01 | End: 2017-01-01 | Stop reason: HOSPADM

## 2017-01-01 RX ORDER — SODIUM BICARBONATE 1 MEQ/ML
50 SYRINGE (ML) INTRAVENOUS
Status: COMPLETED | OUTPATIENT
Start: 2017-01-01 | End: 2017-01-01

## 2017-01-01 RX ORDER — MORPHINE SULFATE 4 MG/ML
4 INJECTION, SOLUTION INTRAMUSCULAR; INTRAVENOUS
Status: DISCONTINUED | OUTPATIENT
Start: 2017-01-01 | End: 2017-01-01 | Stop reason: HOSPADM

## 2017-01-01 RX ORDER — SODIUM CHLORIDE 9 MG/ML
100 INJECTION, SOLUTION INTRAVENOUS CONTINUOUS
Status: DISCONTINUED | OUTPATIENT
Start: 2017-01-01 | End: 2017-01-01

## 2017-01-01 RX ORDER — POTASSIUM CHLORIDE 14.9 MG/ML
20 INJECTION INTRAVENOUS
Status: DISCONTINUED | OUTPATIENT
Start: 2017-01-01 | End: 2017-01-01

## 2017-01-01 RX ORDER — LORAZEPAM 2 MG/ML
0.5 INJECTION INTRAMUSCULAR
Status: DISCONTINUED | OUTPATIENT
Start: 2017-01-01 | End: 2017-01-01 | Stop reason: HOSPADM

## 2017-01-01 RX ORDER — ETOMIDATE 2 MG/ML
10 INJECTION INTRAVENOUS ONCE
Status: COMPLETED | OUTPATIENT
Start: 2017-01-01 | End: 2017-01-01

## 2017-01-01 RX ORDER — HYDROCORTISONE SODIUM SUCCINATE 100 MG/2ML
25 INJECTION, POWDER, FOR SOLUTION INTRAMUSCULAR; INTRAVENOUS EVERY 12 HOURS
Status: DISCONTINUED | OUTPATIENT
Start: 2017-01-01 | End: 2017-01-01

## 2017-01-01 RX ORDER — SODIUM CHLORIDE 9 MG/ML
250 INJECTION, SOLUTION INTRAVENOUS AS NEEDED
Status: DISCONTINUED | OUTPATIENT
Start: 2017-01-01 | End: 2017-01-01 | Stop reason: HOSPADM

## 2017-01-01 RX ORDER — CEFAZOLIN SODIUM IN 0.9 % NACL 2 G/50 ML
2 INTRAVENOUS SOLUTION, PIGGYBACK (ML) INTRAVENOUS EVERY 12 HOURS
Status: DISCONTINUED | OUTPATIENT
Start: 2017-01-01 | End: 2017-01-01

## 2017-01-01 RX ORDER — PROPOFOL 10 MG/ML
INJECTION, EMULSION INTRAVENOUS
Status: COMPLETED
Start: 2017-01-01 | End: 2017-01-01

## 2017-01-01 RX ORDER — SODIUM CHLORIDE 0.9 % (FLUSH) 0.9 %
3 SYRINGE (ML) INJECTION AS NEEDED
Status: DISCONTINUED | OUTPATIENT
Start: 2017-01-01 | End: 2017-01-01 | Stop reason: HOSPADM

## 2017-01-01 RX ORDER — PANTOPRAZOLE SODIUM 40 MG/1
40 TABLET, DELAYED RELEASE ORAL
Status: DISCONTINUED | OUTPATIENT
Start: 2017-01-01 | End: 2017-01-01

## 2017-01-01 RX ORDER — SODIUM CHLORIDE 450 MG/100ML
125 INJECTION, SOLUTION INTRAVENOUS CONTINUOUS
Status: DISCONTINUED | OUTPATIENT
Start: 2017-01-01 | End: 2017-01-01

## 2017-01-01 RX ORDER — ENOXAPARIN SODIUM 100 MG/ML
60 INJECTION SUBCUTANEOUS EVERY 24 HOURS
Status: DISCONTINUED | OUTPATIENT
Start: 2017-01-01 | End: 2017-01-01

## 2017-01-01 RX ADMIN — MORPHINE SULFATE 4 MG: 4 INJECTION, SOLUTION INTRAMUSCULAR; INTRAVENOUS at 09:34

## 2017-01-01 RX ADMIN — Medication 10 ML: at 22:18

## 2017-01-01 RX ADMIN — PIPERACILLIN AND TAZOBACTAM 3.38 G: 3; .375 INJECTION, POWDER, FOR SOLUTION INTRAVENOUS at 05:43

## 2017-01-01 RX ADMIN — Medication 10 ML: at 21:18

## 2017-01-01 RX ADMIN — TUBERCULIN PURIFIED PROTEIN DERIVATIVE 5 UNITS: 5 INJECTION, SOLUTION INTRADERMAL at 17:24

## 2017-01-01 RX ADMIN — DEXTROSE MONOHYDRATE 125 ML/HR: 5 INJECTION, SOLUTION INTRAVENOUS at 11:59

## 2017-01-01 RX ADMIN — MORPHINE SULFATE 2 MG: 2 INJECTION, SOLUTION INTRAMUSCULAR; INTRAVENOUS at 04:04

## 2017-01-01 RX ADMIN — ENOXAPARIN SODIUM 60 MG: 60 INJECTION SUBCUTANEOUS at 23:27

## 2017-01-01 RX ADMIN — Medication 10 ML: at 06:07

## 2017-01-01 RX ADMIN — LORAZEPAM 0.5 MG: 2 INJECTION INTRAMUSCULAR; INTRAVENOUS at 04:14

## 2017-01-01 RX ADMIN — SODIUM CHLORIDE 40 MG: 9 INJECTION INTRAMUSCULAR; INTRAVENOUS; SUBCUTANEOUS at 09:36

## 2017-01-01 RX ADMIN — LIDOCAINE HYDROCHLORIDE 5 ML: 20 SOLUTION ORAL; TOPICAL at 09:33

## 2017-01-01 RX ADMIN — PIPERACILLIN AND TAZOBACTAM 3.38 G: 3; .375 INJECTION, POWDER, FOR SOLUTION INTRAVENOUS at 06:07

## 2017-01-01 RX ADMIN — HYDROCORTISONE SODIUM SUCCINATE 50 MG: 100 INJECTION, POWDER, FOR SOLUTION INTRAMUSCULAR; INTRAVENOUS at 17:46

## 2017-01-01 RX ADMIN — Medication 10 ML: at 14:00

## 2017-01-01 RX ADMIN — POTASSIUM CHLORIDE 20 MEQ: 14.9 INJECTION, SOLUTION INTRAVENOUS at 05:10

## 2017-01-01 RX ADMIN — MORPHINE SULFATE 10 MG: 100 SOLUTION ORAL at 20:31

## 2017-01-01 RX ADMIN — SODIUM CHLORIDE 100 ML/HR: 900 INJECTION, SOLUTION INTRAVENOUS at 20:06

## 2017-01-01 RX ADMIN — Medication 10 ML: at 06:12

## 2017-01-01 RX ADMIN — Medication 10 ML: at 21:50

## 2017-01-01 RX ADMIN — DEXTROSE MONOHYDRATE 125 ML/HR: 5 INJECTION, SOLUTION INTRAVENOUS at 12:00

## 2017-01-01 RX ADMIN — Medication 10 ML: at 21:24

## 2017-01-01 RX ADMIN — VANCOMYCIN HYDROCHLORIDE 750 MG: 10 INJECTION, POWDER, LYOPHILIZED, FOR SOLUTION INTRAVENOUS at 22:18

## 2017-01-01 RX ADMIN — SODIUM CHLORIDE: 900 INJECTION, SOLUTION INTRAVENOUS at 13:31

## 2017-01-01 RX ADMIN — ENOXAPARIN SODIUM 60 MG: 60 INJECTION SUBCUTANEOUS at 22:48

## 2017-01-01 RX ADMIN — SODIUM CHLORIDE 40 MG: 9 INJECTION INTRAMUSCULAR; INTRAVENOUS; SUBCUTANEOUS at 10:53

## 2017-01-01 RX ADMIN — ACETAMINOPHEN 650 MG: 650 SUPPOSITORY RECTAL at 20:04

## 2017-01-01 RX ADMIN — DEXTROSE MONOHYDRATE 125 ML/HR: 5 INJECTION, SOLUTION INTRAVENOUS at 04:14

## 2017-01-01 RX ADMIN — SENNOSIDES 8.6 MG: 8.6 TABLET, FILM COATED ORAL at 20:23

## 2017-01-01 RX ADMIN — HYDROCORTISONE SODIUM SUCCINATE 50 MG: 100 INJECTION, POWDER, FOR SOLUTION INTRAMUSCULAR; INTRAVENOUS at 21:49

## 2017-01-01 RX ADMIN — SODIUM CHLORIDE 100 ML/HR: 900 INJECTION, SOLUTION INTRAVENOUS at 05:48

## 2017-01-01 RX ADMIN — POTASSIUM CHLORIDE 20 MEQ: 14.9 INJECTION, SOLUTION INTRAVENOUS at 12:13

## 2017-01-01 RX ADMIN — ENOXAPARIN SODIUM 60 MG: 60 INJECTION SUBCUTANEOUS at 22:36

## 2017-01-01 RX ADMIN — PIPERACILLIN AND TAZOBACTAM 4.5 G: 4; .5 INJECTION, POWDER, FOR SOLUTION INTRAVENOUS at 17:48

## 2017-01-01 RX ADMIN — SODIUM CHLORIDE 100 ML/HR: 900 INJECTION, SOLUTION INTRAVENOUS at 20:54

## 2017-01-01 RX ADMIN — SODIUM CHLORIDE 50 ML/HR: 450 INJECTION, SOLUTION INTRAVENOUS at 12:11

## 2017-01-01 RX ADMIN — SUCCINYLCHOLINE CHLORIDE 40 MG: 20 INJECTION, SOLUTION INTRAMUSCULAR; INTRAVENOUS at 17:08

## 2017-01-01 RX ADMIN — SODIUM CHLORIDE 40 MG: 9 INJECTION INTRAMUSCULAR; INTRAVENOUS; SUBCUTANEOUS at 10:14

## 2017-01-01 RX ADMIN — Medication 10 ML: at 06:24

## 2017-01-01 RX ADMIN — DEXTROSE MONOHYDRATE 125 ML/HR: 5 INJECTION, SOLUTION INTRAVENOUS at 12:09

## 2017-01-01 RX ADMIN — HYDROCORTISONE SODIUM SUCCINATE 50 MG: 100 INJECTION, POWDER, FOR SOLUTION INTRAMUSCULAR; INTRAVENOUS at 09:34

## 2017-01-01 RX ADMIN — CEFAZOLIN 2 G: 1 INJECTION, POWDER, FOR SOLUTION INTRAMUSCULAR; INTRAVENOUS; PARENTERAL at 15:42

## 2017-01-01 RX ADMIN — SODIUM CHLORIDE 125 ML/HR: 450 INJECTION, SOLUTION INTRAVENOUS at 12:02

## 2017-01-01 RX ADMIN — SODIUM BICARBONATE 50 MEQ: 84 INJECTION, SOLUTION INTRAVENOUS at 17:47

## 2017-01-01 RX ADMIN — Medication 5 ML: at 22:00

## 2017-01-01 RX ADMIN — Medication 5 ML: at 14:00

## 2017-01-01 RX ADMIN — ENOXAPARIN SODIUM 60 MG: 60 INJECTION SUBCUTANEOUS at 23:46

## 2017-01-01 RX ADMIN — VANCOMYCIN HYDROCHLORIDE 750 MG: 10 INJECTION, POWDER, LYOPHILIZED, FOR SOLUTION INTRAVENOUS at 19:07

## 2017-01-01 RX ADMIN — SODIUM CHLORIDE 125 ML/HR: 450 INJECTION, SOLUTION INTRAVENOUS at 04:40

## 2017-01-01 RX ADMIN — LORAZEPAM 0.5 MG: 2 INJECTION INTRAMUSCULAR; INTRAVENOUS at 16:58

## 2017-01-01 RX ADMIN — VANCOMYCIN HYDROCHLORIDE 750 MG: 10 INJECTION, POWDER, LYOPHILIZED, FOR SOLUTION INTRAVENOUS at 07:58

## 2017-01-01 RX ADMIN — HYDROCORTISONE SODIUM SUCCINATE 50 MG: 100 INJECTION, POWDER, FOR SOLUTION INTRAMUSCULAR; INTRAVENOUS at 02:49

## 2017-01-01 RX ADMIN — HYDROCORTISONE SODIUM SUCCINATE 25 MG: 100 INJECTION, POWDER, FOR SOLUTION INTRAMUSCULAR; INTRAVENOUS at 20:58

## 2017-01-01 RX ADMIN — Medication 5 MCG/KG/MIN: at 17:05

## 2017-01-01 RX ADMIN — HYDROCORTISONE SODIUM SUCCINATE 50 MG: 100 INJECTION, POWDER, FOR SOLUTION INTRAMUSCULAR; INTRAVENOUS at 21:18

## 2017-01-01 RX ADMIN — DEXTROSE MONOHYDRATE 50 ML/HR: 5 INJECTION, SOLUTION INTRAVENOUS at 22:36

## 2017-01-01 RX ADMIN — SODIUM CHLORIDE 40 MG: 9 INJECTION INTRAMUSCULAR; INTRAVENOUS; SUBCUTANEOUS at 09:24

## 2017-01-01 RX ADMIN — SODIUM CHLORIDE 1224 ML: 900 INJECTION, SOLUTION INTRAVENOUS at 17:08

## 2017-01-01 RX ADMIN — PIPERACILLIN AND TAZOBACTAM 3.38 G: 3; .375 INJECTION, POWDER, FOR SOLUTION INTRAVENOUS at 06:24

## 2017-01-01 RX ADMIN — SODIUM CHLORIDE 40 MG: 9 INJECTION INTRAMUSCULAR; INTRAVENOUS; SUBCUTANEOUS at 09:33

## 2017-01-01 RX ADMIN — CEFAZOLIN 2 G: 1 INJECTION, POWDER, FOR SOLUTION INTRAMUSCULAR; INTRAVENOUS; PARENTERAL at 03:57

## 2017-01-01 RX ADMIN — HYDROCORTISONE SODIUM SUCCINATE 50 MG: 100 INJECTION, POWDER, FOR SOLUTION INTRAMUSCULAR; INTRAVENOUS at 01:47

## 2017-01-01 RX ADMIN — POTASSIUM CHLORIDE 20 MEQ: 14.9 INJECTION, SOLUTION INTRAVENOUS at 15:09

## 2017-01-01 RX ADMIN — CEFAZOLIN 2 G: 1 INJECTION, POWDER, FOR SOLUTION INTRAMUSCULAR; INTRAVENOUS; PARENTERAL at 17:26

## 2017-01-01 RX ADMIN — SODIUM CHLORIDE 125 ML/HR: 450 INJECTION, SOLUTION INTRAVENOUS at 20:42

## 2017-01-01 RX ADMIN — PIPERACILLIN AND TAZOBACTAM 3.38 G: 3; .375 INJECTION, POWDER, FOR SOLUTION INTRAVENOUS at 17:53

## 2017-01-01 RX ADMIN — PROPOFOL 5 MCG/KG/MIN: 10 INJECTION, EMULSION INTRAVENOUS at 17:05

## 2017-01-01 RX ADMIN — PIPERACILLIN AND TAZOBACTAM 3.38 G: 3; .375 INJECTION, POWDER, FOR SOLUTION INTRAVENOUS at 06:17

## 2017-01-01 RX ADMIN — DEXTROSE MONOHYDRATE 125 ML/HR: 5 INJECTION, SOLUTION INTRAVENOUS at 20:15

## 2017-01-01 RX ADMIN — Medication 10 ML: at 14:56

## 2017-01-01 RX ADMIN — ENOXAPARIN SODIUM 60 MG: 60 INJECTION SUBCUTANEOUS at 22:20

## 2017-01-01 RX ADMIN — SODIUM BICARBONATE 100 MEQ: 84 INJECTION, SOLUTION INTRAVENOUS at 16:47

## 2017-01-01 RX ADMIN — PIPERACILLIN AND TAZOBACTAM 3.38 G: 3; .375 INJECTION, POWDER, FOR SOLUTION INTRAVENOUS at 17:38

## 2017-01-01 RX ADMIN — LORAZEPAM 0.5 MG: 2 INJECTION INTRAMUSCULAR; INTRAVENOUS at 21:41

## 2017-01-01 RX ADMIN — Medication 10 ML: at 15:51

## 2017-01-01 RX ADMIN — HYDROCORTISONE SODIUM SUCCINATE 50 MG: 100 INJECTION, POWDER, FOR SOLUTION INTRAMUSCULAR; INTRAVENOUS at 17:53

## 2017-01-01 RX ADMIN — PIPERACILLIN AND TAZOBACTAM 3.38 G: 3; .375 INJECTION, POWDER, FOR SOLUTION INTRAVENOUS at 18:07

## 2017-01-01 RX ADMIN — HYDROCORTISONE SODIUM SUCCINATE 50 MG: 100 INJECTION, POWDER, FOR SOLUTION INTRAMUSCULAR; INTRAVENOUS at 09:36

## 2017-01-01 RX ADMIN — MORPHINE SULFATE 10 MG: 100 SOLUTION ORAL at 02:37

## 2017-01-01 RX ADMIN — CEFAZOLIN 2 G: 1 INJECTION, POWDER, FOR SOLUTION INTRAMUSCULAR; INTRAVENOUS; PARENTERAL at 03:25

## 2017-01-01 RX ADMIN — HYDROCORTISONE SODIUM SUCCINATE 50 MG: 100 INJECTION, POWDER, FOR SOLUTION INTRAMUSCULAR; INTRAVENOUS at 10:13

## 2017-01-01 RX ADMIN — ETOMIDATE 10 MG: 2 INJECTION, SOLUTION INTRAVENOUS at 17:07

## 2017-01-01 RX ADMIN — HYDROCORTISONE SODIUM SUCCINATE 50 MG: 100 INJECTION, POWDER, FOR SOLUTION INTRAMUSCULAR; INTRAVENOUS at 09:33

## 2017-01-01 RX ADMIN — Medication 10 ML: at 06:18

## 2017-01-01 RX ADMIN — SODIUM CHLORIDE 2000 ML: 900 INJECTION, SOLUTION INTRAVENOUS at 17:13

## 2017-01-01 RX ADMIN — ENOXAPARIN SODIUM 60 MG: 60 INJECTION SUBCUTANEOUS at 22:24

## 2017-01-01 RX ADMIN — Medication 10 ML: at 13:56

## 2017-01-01 RX ADMIN — Medication 10 ML: at 06:00

## 2017-01-01 RX ADMIN — HYDROCORTISONE SODIUM SUCCINATE 25 MG: 100 INJECTION, POWDER, FOR SOLUTION INTRAMUSCULAR; INTRAVENOUS at 09:23

## 2017-01-01 RX ADMIN — POTASSIUM CHLORIDE 20 MEQ: 14.9 INJECTION, SOLUTION INTRAVENOUS at 10:54

## 2017-01-01 RX ADMIN — SODIUM CHLORIDE 40 MG: 9 INJECTION INTRAMUSCULAR; INTRAVENOUS; SUBCUTANEOUS at 09:34

## 2017-01-01 RX ADMIN — Medication 5 ML: at 06:00

## 2017-01-13 PROBLEM — R09.02 HYPOXEMIA: Status: ACTIVE | Noted: 2017-01-01

## 2017-01-13 PROBLEM — I95.9 HYPOTENSION: Status: ACTIVE | Noted: 2017-01-01

## 2017-01-13 PROBLEM — J96.00 ACUTE RESPIRATORY FAILURE (HCC): Status: ACTIVE | Noted: 2017-01-01

## 2017-01-13 PROBLEM — R65.20 SEVERE SEPSIS (HCC): Status: ACTIVE | Noted: 2017-01-01

## 2017-01-13 PROBLEM — A41.9 SEVERE SEPSIS (HCC): Status: ACTIVE | Noted: 2017-01-01

## 2017-01-13 PROBLEM — G93.1 ANOXIC BRAIN INJURY (HCC): Status: ACTIVE | Noted: 2017-01-01

## 2017-01-13 PROBLEM — J18.9 CAP (COMMUNITY ACQUIRED PNEUMONIA): Status: ACTIVE | Noted: 2017-01-01

## 2017-01-13 NOTE — ED PROVIDER NOTES
HPI Comments: Presents via EMS with complaint of respiratory failure and altered mental status. patient is currently nasally intubated. EMS reports PD was called for a wellness check by patient's son and they were called when patient was found lying in bed minimally responsive. EMS reports her sats were in the 70s and her blood pressure was in the 90s and they nasally intubated her on the truck. She was not given any medications. A blood sugar was not checked. There were no prescription medications in the home. The patient's name and medical history is unknown but EMS has the phone number of a family member. Patient is a 80 y.o. female presenting with altered mental status. The history is provided by the EMS personnel. The history is limited by the condition of the patient. Altered mental status    This is a new problem. Episode onset: unknown, last known well time was 2 days ago. The problem has not changed since onset. Associated symptoms include unresponsiveness. Mental status baseline: unknown. No past medical history on file. No past surgical history on file. No family history on file. Social History     Social History    Marital status: N/A     Spouse name: N/A    Number of children: N/A    Years of education: N/A     Occupational History    Not on file. Social History Main Topics    Smoking status: Not on file    Smokeless tobacco: Not on file    Alcohol use Not on file    Drug use: Not on file    Sexual activity: Not on file     Other Topics Concern    Not on file     Social History Narrative         ALLERGIES: Review of patient's allergies indicates no known allergies.     Review of Systems   Unable to perform ROS: Severe respiratory distress       Vitals:    01/13/17 1744 01/13/17 1750 01/13/17 1755 01/13/17 1809   BP: 116/86 116/86 144/87    Pulse:       Resp:       Temp:    98.8 °F (37.1 °C)   SpO2: 90%      Weight:                Physical Exam Constitutional: She appears cachectic. She appears toxic. She appears ill. She appears distressed. She is intubated. HENT:   Head: Normocephalic and atraumatic. Mucous membranes are extremely dry   Neck: Normal range of motion. Neck supple. Cardiovascular: Regular rhythm. Tachycardia present. Pulmonary/Chest: She is intubated. She is in respiratory distress. She is tachypneic and has rales throughout with decreased BS on the L. Nasal Tube pulled back and lung sounds on L improved. Abdominal: Soft. She exhibits no distension. Musculoskeletal: Normal range of motion. She exhibits no edema. Neurological: She is unresponsive. Skin: Skin is warm and dry. No erythema. Psychiatric:   Unable to assess     Nursing note and vitals reviewed. MDM  Number of Diagnoses or Management Options  Acute encephalopathy:   Acute respiratory failure with hypoxia and hypercapnia [facility]):   Septic shock [facility]):   Diagnosis management comments: Patient arrived as a Rebekah Tee. I was able to obtain her medical record number prior to merge of her charts and there is no past medical history on her. Her x-ray showed that the nasal ET tube was above her glottis so it was removed and she was reintubated using glidescope. She was given a dose of bicarbonate. She was given 4 L of fluid and think and Zosyn for a presumed sepsis and lactic of 6.7. Her family was called but did not answer the phone. She was also given solu Cortef for septic shock. Her pH on her blood gas improved without the bicarbonate drip and with vent settings only so it was DC'd per Dr. Jigna Torres. She is placed on a propofol drip for sedation.     ===================================================================  This patient is critically ill and there is a high probability of of imminent or life threatening deterioration in the patient's condition without immediate management.     The nature of the patient's clinical problem is: acute resp failure    I have spent 45 minutes in direct patient care, documentation, review of labs/xrays/old records, discussion with Staff, Colleague, Nursing, EMS . The time involved in the performance of separately reportable procedures was not counted toward critical care time. Deidra Hauser MD; 1/13/2017 @6:43 PM  ===================================================================           Amount and/or Complexity of Data Reviewed  Clinical lab tests: ordered and reviewed  Decide to obtain previous medical records or to obtain history from someone other than the patient: yes (EMS)  Review and summarize past medical records: yes (None available)  Discuss the patient with other providers: yes  Independent visualization of images, tracings, or specimens: yes (Sinus tach biatrial enlargement)    Risk of Complications, Morbidity, and/or Mortality  Presenting problems: high  Diagnostic procedures: high  Management options: high    Critical Care  Total time providing critical care: 30-74 minutes    Patient Progress  Patient progress: (Critical  )    ED Course       Intubation  Date/Time: 1/13/2017 5:05 PM  Performed by: Nolan Landa  Authorized by: Lucina SANCHEZ     Consent:     Consent obtained:  Emergent situation  Pre-procedure details:     Patient status:  Altered mental status    Pretreatment meds: Etomidate. Paralytics:  Succinylcholine  Procedure details:     Preoxygenation: Nasal airway. CPR in progress: no      Intubation method:  Oral    Oral intubation technique:  Video-assisted    Laryngoscope blade: Mac 3    Tube size (mm):  7.5    Tube type:  Cuffed    Number of attempts:  1    Ventilation between attempts: no      Cricoid pressure: no      Tube visualized through cords: yes    Placement assessment:     ETT to lip:  21    Tube secured with:   Adhesive tape and ETT craig    Breath sounds:  Equal    Placement verification: chest rise      CXR findings:  ETT in proper place  Post-procedure details:     Patient tolerance of procedure:   Tolerated well, no immediate complications

## 2017-01-13 NOTE — H&P
HISTORY AND PHYSICAL      Scot Guanakito    1/13/2017    Date of Admission:  1/13/2017    The patient's chart is reviewed and the patient is discussed with the staff. Subjective:     Patient is an 80 y.o.  female brought to the ER by EMS. Son lives in [place]. He talked to patient on Wednesday morning and felt that patient was not responding / functioning normally. Patient instructed family not to come. Son was then unable to reach his mother and so drove to [place] to check on her. The house was locked from the inside and son contacted the police who had to break down the doors to access residence. Patient has a 49 yo daughter who is severely mentally handicapped and who was found laying in the bed next to the patient. Son reports that patient had been incontinent of urine and stool in multiple locations in the house and that she was laying half on /half off of the bed when they gained access. She was unresponsive with an o2 sat in the 70s and was nasally intubated and brought to the ER for further evaluation. Son reports that patient is a natural / holistic and takes no prescription medications. Son is not aware of any underlying health issues. WBC is elevated at 14.4, CXR with LLL infiltrate, HR in the 120-130s, and initially hypotensive with BP in the 80/50s which has improved with IVF. Lactic acid 6.7. Discussed code status and patient's son. He and his sister do not feel that patient would want resuscitation should it be necessary. Review of Systems  Review of systems not obtained due to patient factors. Patient Active Problem List   Diagnosis Code    Acute respiratory failure ([facility] [address] J96.00    Anoxic brain injury ([facility] [address] G93.1       None       No past medical history on file. No past surgical history on file.   Social History     Social History    Marital status: N/A     Spouse name: N/A    Number of children: N/A    Years of education: N/A     Occupational History    Not on file. Social History Main Topics    Smoking status: Not on file    Smokeless tobacco: Not on file    Alcohol use Not on file    Drug use: Not on file    Sexual activity: Not on file     Other Topics Concern    Not on file     Social History Narrative     No family history on file. No Known Allergies    Current Facility-Administered Medications   Medication Dose Route Frequency    sodium chloride (NS) flush 5-10 mL  5-10 mL IntraVENous PRN    vancomycin (VANCOCIN) 816 mg in 0.9% sodium chloride 250 mL IVPB  20 mg/kg IntraVENous ONCE    piperacillin-tazobactam (ZOSYN) 4.5 g in 0.9% sodium chloride (MBP/ADV) 100 mL  4.5 g IntraVENous Q8H    vancomycin (VANCOCIN) 750 mg in  ml infusion  750 mg IntraVENous ONCE    sodium chloride 0.9 % bolus infusion 2,000 mL  2,000 mL IntraVENous ONCE    propofol (DIPRIVAN) infusion  5-50 mcg/kg/min IntraVENous TITRATE    sodium bicarbonate (8.4%) 100 mEq in dextrose 5% 1,000 mL infusion   IntraVENous CONTINUOUS     No current outpatient prescriptions on file. Objective:     Vitals:    01/13/17 1744 01/13/17 1750 01/13/17 1755 01/13/17 1809   BP: 116/86 116/86 144/87    Pulse:       Resp:       Temp:    98.8 °F (37.1 °C)   SpO2: 90%      Weight:           PHYSICAL EXAM     Constitutional:  the patient is well developed and in no acute distress  EENMT:  Sclera clear, pupils equal, oral mucosa moist  Respiratory: scattered rhonchi, orally intubated  Cardiovascular:  RRR without M,G,R, tachycardic  Gastrointestinal: soft and non-tender; with positive bowel sounds. Musculoskeletal: warm without cyanosis. There is 2+ R lower leg edema.   Skin:  no jaundice or rashes, no open wounds   Neurologic: no gross neuro deficits     Psychiatric:  sedated    Chest Xray:  1/13        Recent Labs      01/13/17   1715   WBC  14.4*   HGB  10.5*   HCT  34.3*   PLT  370   INR  1.0     Recent Labs      01/13/17   1715   NA  150*   K  4.4   CL  116*   GLU  112*   CO2  18* BUN  67*   CREA  2.72*   MG  2.8*   CA  9.3   ALB  2.6*   TBILI  0.3   ALT  29   SGOT  30     Recent Labs      01/13/17   1718   PH  7.14*   PCO2  51*   PO2  78   HCO3  17*       Assessment:  (Medical Decision Making)     Hospital Problems  Date Reviewed: 1/13/2017          Codes Class Noted POA    Acute respiratory failure ([facility] [address] ICD-10-CM: J96.00  ICD-9-CM: 518.81  1/13/2017 Yes    Requiring intubation      Anoxic brain injury [facility]) ICD-10-CM: G93.1  ICD-9-CM: 348.1  1/13/2017 Yes    ? Amount of time with hypoxemia  Found unresponsive with sats in the 70s      Hypotension ICD-10-CM: I95.9  ICD-9-CM: 458.9  1/13/2017 Yes    BP initially in the 80/50s  Improved with IVF      Hypoxemia ICD-10-CM: R09.02  ICD-9-CM: 799.02  1/13/2017 Yes    sats initially in the 70s         Plan:  (Medical Decision Making)     --Will admit to ICU for further medical management  --Supplemental O2 - orally intubated  --Respiratory nebulizer treatments  --culture  --steroid therapy  --antibiotic therapy  --LE doppler  --IVF  --patient's son and daughter do not wish for further resuscitative efforts  --will nee SW involvement - patient has 51 yo severely mentally handicapped daughter for whom she alone provides round the clock care. More than 50% of the time documented was spent in face-to-face contact with the patient and in the care of the patient on the floor/unit where the patient is located. Edgar Marie NP     The patient has been seen and examined by me personally, the chart,labs, and radiographic studies have been reviewed. Chest:bilatral rhonchi   Extremities: trace edema      I agree with the above assessment and plan.     Herb Lozano MD.

## 2017-01-13 NOTE — ED NOTES
Pt to er by ems from home. .. Ems sts called out for welfare check. .. Ems reports sats in low70's  Hr 145, b/p 90/40. .. Pt responded to pain with movment, pt non verbal for ems. .. Pt placed on non rebreather by ems. .. nasel tube in rt nare in route to er. .. Bilateral 18ga iv in a/c.

## 2017-01-13 NOTE — ED NOTES
EMS called [facility] office and obtained correct name: Rachel Salazar 11/07/1930, original MRN [id number]

## 2017-01-13 NOTE — PROGRESS NOTES
Patient was intubated with a number 7.5 ET Tube. Tube placement verified by auscultation and ETCO2 monitor. ET Tube is secured at the 21 cm babar at the lip and on the right side. Patient was intubated by Dr Bridgette Cherry on the 1 attempt. Breath sounds are coarse. Patient is Negative for subcutaneous air and chest excursion is symmetric. Trachea is midline. Patient is also Negative for cyanosis and is Negative for pitting edema. Patient placed on ventilator on documented settings. All alarms are set and audible. Resuscitation bag is at the head of the bed.       Ventilator Settings  Mode FIO2 Rate Tidal Volume Pressure PEEP I:E Ratio   PRVC  100 %   12 400 ml     8 cm H20  1:4.5      Peak airway pressure: 22 cm H2O   Minute ventilation: 14.4 l/min     ABG:   Recent Labs      01/13/17   1718   PH  7.14*   PCO2  51*   PO2  78   HCO3  17*

## 2017-01-14 PROBLEM — N19 RENAL FAILURE: Status: ACTIVE | Noted: 2017-01-01

## 2017-01-14 PROBLEM — D64.9 ANEMIA: Status: ACTIVE | Noted: 2017-01-01

## 2017-01-14 PROBLEM — E87.0 HYPERNATREMIA: Status: ACTIVE | Noted: 2017-01-01

## 2017-01-14 PROBLEM — N39.0 UTI (URINARY TRACT INFECTION): Status: ACTIVE | Noted: 2017-01-01

## 2017-01-14 NOTE — PROGRESS NOTES
Reviewed notes for spiritual concerns  Signed by Maty Felder, [facility],

## 2017-01-14 NOTE — PROGRESS NOTES
Pharmacokinetic Consult to Pharmacist    Tere Turner is a 80 y.o. female presenting with acute respiratory failure requiring intubation. She is being treated for sepsis with zosyn and vancomycin. Height: 5' 4\" (162.6 cm)  Weight: 59.8 kg (131 lb 13.4 oz)  Lab Results   Component Value Date/Time    BUN 67 01/13/2017 05:15 PM    Creatinine 2.72 01/13/2017 05:15 PM    WBC 14.4 01/13/2017 05:15 PM    Procalcitonin 6.3 01/13/2017 05:15 PM      Estimated Creatinine Clearance: 12.8 mL/min (based on Cr of 2.72). CULTURES:  01/13 BC x 2: pending   UC: pending   Sputum cx: pending   MRSA screen: negative    Day 1 of vancomycin. Goal trough is 15-20. Vancomycin 750 mg x 1 dose; will schedule future doses based on random levels d/t renal dysfunction. Will continue to follow patient.       Thank you,  Irvin Romano, PharmD

## 2017-01-14 NOTE — PROGRESS NOTES
Ventilator check complete; patient has a #7.5 ET tube secured at the 21 at the lip. Patient is sedated. Patient is not able to follow commands. Breath sounds are coarse. Trachea is midline, Negative for subcutaneous air, and chest excursion is symmetric. Patient is also Negative for cyanosis and is Negative for pitting edema. All alarms are set and audible. Resuscitation bag is at the head of the bed.       Ventilator Settings  Mode FIO2 Rate Tidal Volume Pressure PEEP I:E Ratio   Pressure control  75 %    400 ml     8 cm H20  1:2.00      Peak airway pressure: 33.2 cm H2O   Minute ventilation: 16 l/min     ABG:   Recent Labs      01/13/17   1812  01/13/17   1718   PH  7.22*  7.14*   PCO2  46*  51*   PO2  151*  78   HCO3  18*  17*         Na Perez, RT

## 2017-01-14 NOTE — PROGRESS NOTES
Care Daily Progress Note: 1/14/2017  Admission Date: 1/13/2017     The patient's chart is reviewed and the patient is discussed with the staff. 79 yo WF found unresponsive at home with respiratory failure, renal failure, and UTI. Subjective:     Sedated on vent. On no pressors. No distress evident. Current Facility-Administered Medications   Medication Dose Route Frequency    vancomycin (VANCOCIN) IV ~ pharmacy dosing/monitoring  500 mg IntraVENous Rx Dosing/Monitoring    pantoprazole (PROTONIX) 40 mg in sodium chloride 0.9 % 10 mL injection  40 mg IntraVENous DAILY    sodium chloride (NS) flush 5-10 mL  5-10 mL IntraVENous PRN    propofol (DIPRIVAN) infusion  5-50 mcg/kg/min IntraVENous TITRATE    sodium chloride (NS) flush 5-10 mL  5-10 mL IntraVENous Q8H    sodium chloride (NS) flush 5-10 mL  5-10 mL IntraVENous PRN    acetaminophen (TYLENOL) suppository 650 mg  650 mg Rectal Q4H PRN    0.9% sodium chloride infusion  100 mL/hr IntraVENous CONTINUOUS    hydrocortisone Sod Succ (PF) (SOLU-CORTEF) injection 50 mg  50 mg IntraVENous Q8H    piperacillin-tazobactam (ZOSYN) 3.375 g in 0.9% sodium chloride (MBP/ADV) 100 mL  3.375 g IntraVENous Q12H    enoxaparin (LOVENOX) injection 60 mg  60 mg SubCUTAneous Q24H       Review of Systems     Unobtainable due to patient status. Objective:     Vitals:    01/14/17 0900 01/14/17 0930 01/14/17 1000 01/14/17 1140   BP: 91/51 99/50 103/53    Pulse: 90 89 88 94   Resp: 18 19 19 19   Temp:       SpO2: 98% 99% 99% 95%   Weight:       Height:           Intake and Output:   01/12 1901 - 01/14 0700  In: 945 [I.V.:912]  Out: 360 [Urine:350]       Physical Exam:          Constitutional:  intubated and mechanically ventilated.   EENMT:  Sclera clear, pupils equal, oral mucosa moist  Respiratory: scattered rhonchi  Cardiovascular:  RRR with no M,G,R;  Gastrointestinal:  soft with no tenderness; positive bowel sounds present  Musculoskeletal:  warm with no cyanosis, trace lower leg edema  Skin:  no jaundice or ecchymosis  Neurologic: no gross neuro deficits     Psychiatric:  sedated    LINES:  ETT, PIV, mckeon, NG    DRIPS:  NSS, propofol    CHEST XRAY:          Ventilator Settings  Mode FIO2 Rate Tidal Volume Pressure PEEP   Pressure control  40 %    400 ml  15 cm H2O  8 cm H20      Peak airway pressure: 28.1 cm H2O   Minute ventilation: 11.6 l/min     ABG:   Recent Labs      01/14/17   0308  01/13/17   1812  01/13/17   1718   PH  7.43  7.22*  7.14*   PCO2  24*  46*  51*   PO2  129*  151*  78   HCO3  16*  18*  17*        LAB  No results for input(s): GLUCPOC in the last 72 hours. No lab exists for component: [place]  Recent Labs      01/14/17   0713  01/13/17   1715   WBC  14.9*  14.4*   HGB  8.7*  10.5*   HCT  28.3*  34.3*   PLT  254  370   INR   --   1.0     Recent Labs      01/14/17   0448  01/13/17   1715   NA  153*  150*   K  3.9  4.4   CL  121*  116*   CO2  17*  18*   GLU  96  112*   BUN  64*  67*   CREA  2.40*  2.72*   MG  2.1  2.8*   PHOS  2.0*   --    CA  8.0*  9.3   ALB  2.0*  2.6*   SGOT  52*  30     Recent Labs      01/14/17   0448  01/13/17   2132   LAC  2.2*  4.2*     Blood cx: NG  Urine: GNR    Assessment:  (Medical Decision Making)     Hospital Problems  Date Reviewed: 1/13/2017          Codes Class Noted POA    Hypernatremia ICD-10-CM: E87.0  ICD-9-CM: 276.0  1/14/2017 Unknown    Will adjust IVF    Anemia ICD-10-CM: D64.9  ICD-9-CM: 285.9  1/14/2017 Unknown        Renal failure ICD-10-CM: N19  ICD-9-CM: 492  1/14/2017 Unknown    Creatinine down    UTI (urinary tract infection) ICD-10-CM: N39.0  ICD-9-CM: 599.0  1/14/2017 Unknown    GNR. Acute respiratory failure ([address] ICD-10-CM: J96.00  ICD-9-CM: 518.81  1/13/2017 Yes    Remains on vent. Need to correct hyperchloremic acidosis.     Anoxic brain injury [facility]) ICD-10-CM: G93.1  ICD-9-CM: 348.1  1/13/2017 Yes    Stop propofol and follow neuro exam    Hypotension ICD-10-CM: I95.9  ICD-9-CM: 458.9  1/13/2017 Yes    Not on pressors    Hypoxemia ICD-10-CM: R09.02  ICD-9-CM: 799.02  1/13/2017 Yes        CAP (community acquired pneumonia) ICD-10-CM: J18.9  ICD-9-CM: 338  1/13/2017 Yes    Extensive R infiltrate          Plan:  (Medical Decision Making)     Continue ATB. Stop propofol. Hypotonic IVF. Can try PS trials. --    More than 50% of the time documented was spent in face-to-face contact with the patient and in the care of the patient on the floor/unit where the patient is located.     Isidra Adames MD

## 2017-01-14 NOTE — PROGRESS NOTES
Report received from ultrasound of positive results in [facility] notified, orders received for Lovenox 1 mg/kg.

## 2017-01-14 NOTE — PROGRESS NOTES
Ventilator check complete; patient has a #7.5 ET tube secured at the 22 at the lip. Patient is sedated. Patient is not able to follow commands. Breath sounds are diminished. Trachea is midline, Negative for subcutaneous air, and chest excursion is symmetric. Patient is also Negative for cyanosis. All alarms are set and audible. Resuscitation bag is at the head of the bed.       Ventilator Settings  Mode FIO2 Rate Tidal Volume Pressure PEEP I:E Ratio   Pressure control  40 %   15     20   8 cm H20  1:3.03      Peak airway pressure: 28.1 cm H2O   Minute ventilation: 17 l/min     ABG:   Recent Labs      01/14/17   0308  01/13/17   1812  01/13/17   1718   PH  7.43  7.22*  7.14*   PCO2  24*  46*  51*   PO2  129*  151*  78   HCO3  16*  18*  17*         Apryl Pepper, RT

## 2017-01-14 NOTE — PROGRESS NOTES
Spoke with son, Juliette Mckay, about wishes to make patient DNR status. Dr. See Cai notified and stated order will be placed.

## 2017-01-14 NOTE — ED NOTES
TRANSFER - OUT REPORT:    Verbal report given to Ana Cristina castro (name) on Julia Flores  being transferred to ccu (unit) for routine progression of care       Report consisted of patients Situation, Background, Assessment and   Recommendations(SBAR). Information from the following report(s) SBAR, ED Summary, [facility] and Recent Results was reviewed with the receiving nurse. Lines:   Peripheral IV 01/13/17 Right Antecubital (Active)   Site Assessment Clean, dry, & intact 1/13/2017  5:10 PM   Phlebitis Assessment 0 1/13/2017  5:10 PM   Infiltration Assessment 0 1/13/2017  5:10 PM   Dressing Status Clean, dry, & intact 1/13/2017  5:10 PM       Peripheral IV 01/13/17 Left Other(comment) (Active)   Site Assessment Clean, dry, & intact 1/13/2017  5:12 PM   Phlebitis Assessment 0 1/13/2017  5:12 PM   Infiltration Assessment 0 1/13/2017  5:12 PM   Dressing Status Clean, dry, & intact 1/13/2017  5:12 PM        Opportunity for questions and clarification was provided.       Patient transported with:   Monitor

## 2017-01-14 NOTE — PROGRESS NOTES
Patient arrived to room 3303 via stretcher. Placed on ventilator. Patient occasionally opening eyes to voice, not following commands. Propofol and NS bolus infusing, will titrate to order parameters. Afebrile, 02 sat 96%, NSR on monitor with heart rate in 90's. BP 96/54 with MAP of 77. Patient fully bathed with CHG wipes and mouth care complete. RLE swollen, pulses palpable. NGT placed for LIS. Full assessment in flowsheets. Dual skin assessment complete with Neil Obregon RN. Redness and small open area noted to sacrum/buttocks, heels boggy, small red area noted to upper back. Allevyn placed to sacrum.

## 2017-01-15 NOTE — PROGRESS NOTES
Care Daily Progress Note: 1/15/2017  Admission Date: 1/13/2017     The patient's chart is reviewed and the patient is discussed with the staff. 79 yo WF found unresponsive at home with respiratory failure, renal failure, and UTI. Subjective:     Sedated on vent. On no pressors. No distress evident. Current Facility-Administered Medications   Medication Dose Route Frequency    vancomycin (VANCOCIN) IV ~ pharmacy dosing/monitoring  500 mg IntraVENous Rx Dosing/Monitoring    pantoprazole (PROTONIX) 40 mg in sodium chloride 0.9 % 10 mL injection  40 mg IntraVENous DAILY    0.45% sodium chloride infusion  125 mL/hr IntraVENous CONTINUOUS    LORazepam (ATIVAN) injection 0.5 mg  0.5 mg IntraVENous Q4H PRN    sodium chloride (NS) flush 5-10 mL  5-10 mL IntraVENous PRN    sodium chloride (NS) flush 5-10 mL  5-10 mL IntraVENous Q8H    sodium chloride (NS) flush 5-10 mL  5-10 mL IntraVENous PRN    acetaminophen (TYLENOL) suppository 650 mg  650 mg Rectal Q4H PRN    hydrocortisone Sod Succ (PF) (SOLU-CORTEF) injection 50 mg  50 mg IntraVENous Q8H    piperacillin-tazobactam (ZOSYN) 3.375 g in 0.9% sodium chloride (MBP/ADV) 100 mL  3.375 g IntraVENous Q12H    enoxaparin (LOVENOX) injection 60 mg  60 mg SubCUTAneous Q24H       Review of Systems     Unobtainable due to patient status. Objective:     Vitals:    01/15/17 0901 01/15/17 0931 01/15/17 1001 01/15/17 1031   BP: 107/56 117/62 130/60 138/74   Pulse: 97 (!) 103 99 (!) 101   Resp: 28 27 27 26   Temp:       SpO2: 97% 98% 97% 95%   Weight:       Height:           Intake and Output:   01/13 1901 - 01/15 0700  In: 4757.5 [I.V.:4757.5]  Out: 750 [Urine:650]       Physical Exam:          Constitutional:  intubated and mechanically ventilated.   EENMT:  Sclera clear, pupils equal, oral mucosa moist  Respiratory: scattered rhonchi  Cardiovascular:  RRR with 3/6 SM   Gastrointestinal:  soft with no tenderness; positive bowel sounds present  Musculoskeletal:  warm with no cyanosis, trace lower leg edema  Skin:  no jaundice or ecchymosis  Neurologic: no gross neuro deficits     Psychiatric:  sedated    LINES:  ETT, PIV, mckeon, NG    DRIPS:  NSS, propofol    CHEST XRAY:      1/15:        1/14:        Ventilator Settings  Mode FIO2 Rate Tidal Volume Pressure PEEP   Pressure control  30 %    0 ml  15 cm H2O  8 cm H20      Peak airway pressure: 28 cm H2O   Minute ventilation: 11.7 l/min     ABG:   Recent Labs      01/15/17   0315  01/14/17   0308  01/13/17   1812   PH  7.40  7.43  7.22*   PCO2  20*  24*  46*   PO2  139*  129*  151*   HCO3  12*  16*  18*        LAB  No results for input(s): GLUCPOC in the last 72 hours. No lab exists for component: [place]  Recent Labs      01/15/17   0335  01/14/17   0713  01/13/17   1715   WBC  20.0*  14.9*  14.4*   HGB  7.6*  8.7*  10.5*   HCT  24.7*  28.3*  34.3*   PLT  282  254  370   INR   --    --   1.0     Recent Labs      01/15/17   0335  01/14/17   0448  01/13/17   1715   NA  152*  153*  150*   K  3.9  3.9  4.4   CL  121*  121*  116*   CO2  14*  17*  18*   GLU  102*  96  112*   BUN  70*  64*  67*   CREA  2.93*  2.40*  2.72*   MG  2.0  2.1  2.8*   PHOS  2.1*  2.0*   --    CA  7.5*  8.0*  9.3   ALB   --   2.0*  2.6*   SGOT   --   52*  30     Recent Labs      01/14/17   2037  01/14/17   1245  01/14/17   0448   LAC  1.7  2.2*  2.2*     Blood cx: NG    Blood cx: 1/2 Staph species    Sputum cx: Heavy staph species    Urine: GNR    Assessment:  (Medical Decision Making)     Hospital Problems  Date Reviewed: 1/13/2017          Codes Class Noted POA    Hypernatremia ICD-10-CM: E87.0  ICD-9-CM: 276.0  1/14/2017 Unknown    Will adjust IVF to D5W. Raza Diaz Anemia ICD-10-CM: D64.9  ICD-9-CM: 285.9  1/14/2017 Unknown    Worse; may need tx    Renal failure ICD-10-CM: N19  ICD-9-CM: 207  1/14/2017 Unknown    Creatinine back up    UTI (urinary tract infection) ICD-10-CM: N39.0  ICD-9-CM: 599.0  1/14/2017 Unknown    GNR.  ID still pending    Acute respiratory failure ([facility] [address] ICD-10-CM: J96.00  ICD-9-CM: 518.81  1/13/2017 Yes    Remains on vent. Need to correct hyperchloremic acidosis. [facility] ([facility] ICD-10-CM: G93.1  ICD-9-CM: 348.1  1/13/2017 Yes    Off propofol    Hypotension ICD-10-CM: I95.9  ICD-9-CM: 458.9  1/13/2017 Yes    Not on pressors    Hypoxemia ICD-10-CM: R09.02  ICD-9-CM: 799.02  1/13/2017 Yes    Ongoing    CAP (community acquired pneumonia) ICD-10-CM: J18.9  ICD-9-CM: 846  1/13/2017 Yes    Extensive R infiltrate cleared but now with L infiltrate. Staph pneumonia based on cx. Plan:  (Medical Decision Making)     Continue Vanc and Zosyn D3/8  Tx 1 unit PRBC  D5W  Can try PS trials. KPhos. Check echo tomorrow. Follow labs. Decrease hydrocortisone further. --    More than 50% of the time documented was spent in face-to-face contact with the patient and in the care of the patient on the floor/unit where the patient is located.     Roberta Garrett MD

## 2017-01-15 NOTE — PROGRESS NOTES
Servo S ventilator exchanged for Servo I ventilator with charted settings due to touch screen malfunction.

## 2017-01-15 NOTE — PROGRESS NOTES
Ventilator check complete; patient has a #7.5 ET tube secured at the 21 at the lip. Patient is sedated. Patient is not able to follow commands. Breath sounds are diminished. Trachea is midline, Negative for subcutaneous air, and chest excursion is symmetric. Patient is also Negative for cyanosis and is Negative for pitting edema. All alarms are set and audible. Resuscitation bag is at the head of the bed.       Ventilator Settings  Mode FIO2 Rate Tidal Volume Pressure PEEP I:E Ratio   Pressure control  30 %    0 ml  15 cm H2O  8 cm H20  1:3.03      Peak airway pressure: 28 cm H2O   Minute ventilation: 11.7 l/min     ABG:   Recent Labs      01/15/17   0315  01/14/17   0308  01/13/17   1812   PH  7.40  7.43  7.22*   PCO2  20*  24*  46*   PO2  139*  129*  151*   HCO3  12*  16*  18*         Reynaldo Duron, RT

## 2017-01-15 NOTE — PROGRESS NOTES
BiPAP removed and pt put on 4L nasal cannula. O2 sats >94%. Pt in no apparent distress. Remains very confused and is constantly talking and saying words inappropriate to the situation.

## 2017-01-15 NOTE — PROGRESS NOTES
Pt remains restless and agitated after being repositioned in bed. Biting ET tube and RR 30s. Will medicate per MAR.

## 2017-01-15 NOTE — PROGRESS NOTES
Bedside and Verbal shift change report given to Юлия [place] Lewis (oncoming nurse) by Sarina Panchal RN (offgoing nurse). Report included the following information SBAR, Kardex, ED Summary, Intake/Output, MAR, Recent Results, Med Rec Status and Cardiac Rhythm SR/.

## 2017-01-15 NOTE — PROGRESS NOTES
Son called to obtain blood consent. Skeptical of blood transfusion due to prolonging his mother's care. Consents to this blood transfusion, but would like to be notified of any other blood transfusions.

## 2017-01-15 NOTE — PROGRESS NOTES
Bedside shift report received from [facility]. Pt opens eyes to voice and follows simple commands intermittently. Vital signs within normal limits. Does not appear to be in any distress. NGT to OXANA Vargas patent and draining. Dual skin assessment performed. Skin tear noted to sacrum and covered with allevyn. Boggy heels. Lump on left shoulder. Will continue to monitor.

## 2017-01-15 NOTE — PROGRESS NOTES
Ventilator check complete; patient has a #7.5 ET tube secured at the 22 at the lip. Patient is sedated. Patient is not able to follow commands. Breath sounds are coarse. Trachea is midline, Negative for subcutaneous air, and chest excursion is symmetric. Patient is also Negative for cyanosis and is Negative for pitting edema. All alarms are set and audible. Resuscitation bag is at the head of the bed.       Ventilator Settings  Mode FIO2 Rate Tidal Volume Pressure PEEP I:E Ratio   Pressure control  40 %   15  15 cm H2O  8 cm H20  1:3.03      Peak airway pressure: 28.3 cm H2O   Minute ventilation: 14.8 l/min     ABG:   Recent Labs      01/14/17   0308  01/13/17   1812  01/13/17   1718   PH  7.43  7.22*  7.14*   PCO2  24*  46*  51*   PO2  129*  151*  78   HCO3  16*  18*  17*         Christiane Hudson, RT

## 2017-01-16 PROBLEM — J96.01 ACUTE RESPIRATORY FAILURE WITH HYPOXIA (HCC): Status: ACTIVE | Noted: 2017-01-01

## 2017-01-16 PROBLEM — I82.409 DVT (DEEP VENOUS THROMBOSIS) (HCC): Status: ACTIVE | Noted: 2017-01-01

## 2017-01-16 PROBLEM — I82.401 ACUTE DEEP VEIN THROMBOSIS (DVT) OF RIGHT LOWER EXTREMITY (HCC): Status: ACTIVE | Noted: 2017-01-01

## 2017-01-16 PROBLEM — R78.81 BACTEREMIA DUE TO GRAM-POSITIVE BACTERIA: Status: ACTIVE | Noted: 2017-01-01

## 2017-01-16 NOTE — INTERDISCIPLINARY ROUNDS
Interdisciplinary team rounds were held 1/16/2017 with the following team members:Care Management, Nursing, Nurse Practitioner, Nutrition, Palliative Care, Pastoral Care, Pharmacy, Physical Therapy, Physician, Respiratory Therapy, Wound Care and Clinical Coordinator and the patient. Plan of care discussed. See clinical pathway and/or care plan for interventions and desired outcomes.

## 2017-01-16 NOTE — PROGRESS NOTES
Ventilator check complete; patient has a #7.5 ET tube secured at the 22 at the lip. Patient is not able to follow commands. Breath sounds are diminished. Trachea is midline, Negative for subcutaneous air, and chest excursion is symmetric. Patient is also Negative for cyanosis and is Negative for pitting edema. All alarms are set and audible. Resuscitation bag is at the head of the bed.       Ventilator Settings  Mode FIO2 Rate Tidal Volume Pressure PEEP I:E Ratio   Pressure control  30 %   15  15 cm H2O  8 cm H20  1:2.0      Peak airway pressure: 23 cm H2O   Minute ventilation: 9.9 l/min     ABG:   Recent Labs      01/16/17   0301  01/15/17   0315  01/14/17   0308   PH  7.38  7.40  7.43   PCO2  24*  20*  24*   PO2  113*  139*  129*   HCO3  14*  12*  16*         Julienne Xiong, RT

## 2017-01-16 NOTE — PROGRESS NOTES
Physical Therapy Note:    Participated in interdisciplinary rounds including Adry, Wound Care, Palliative Care NP, RN staff, MD, Respiratory therapy, and Nutrition. Patient at this time is not following commands, therefore with decreased ability to participate in therapy safely. Will continue to participate in rounds to determine appropriateness of PT/OT.     Thank you,  LESLI DavisT

## 2017-01-16 NOTE — PROGRESS NOTES
Ventilator check complete; patient has a #7.5 ET tube secured at the 22 at the lip. Patient is sedated. Patient is able to follow commands. Breath sounds are diminished. Trachea is midline, Negative for subcutaneous air, and chest excursion is symmetric. Patient is also Negative for cyanosis and is Negative for pitting edema. All alarms are set and audible. Resuscitation bag is at the head of the bed.       Ventilator Settings  Mode FIO2 Rate Tidal Volume Pressure PEEP I:E Ratio   Pressure control  30 %  15   15 cm H2O  8 cm H20  1:2.      Peak airway pressure: 28 cm H2O   Minute ventilation: 8.8 l/min     ABG:   Recent Labs      01/15/17   0315  01/14/17   0308  01/13/17   1812   PH  7.40  7.43  7.22*   PCO2  20*  24*  46*   PO2  139*  129*  151*   HCO3  12*  16*  18*         Christiane Hudson, RT

## 2017-01-16 NOTE — PROGRESS NOTES
Spiritual Care Assessment/Progress Notes    Chad Jang [id number]  [id number]    11/7/1930  80 y.o.  female    Patient Telephone Number: [phone] (home)   Hinduism Affiliation: Unknown   Language: English   Extended Emergency Contact Information  Primary Emergency Contact: [id number]  Relation: None   Patient Active Problem List    Diagnosis Date Noted    Hypernatremia 01/14/2017    Anemia 01/14/2017    Renal failure 01/14/2017    UTI (urinary tract infection) 01/14/2017    Acute respiratory failure ([facility] [address] 01/13/2017    Anoxic brain injury ([facility] [address] 01/13/2017    Hypotension 01/13/2017    Hypoxemia 01/13/2017    CAP (community acquired pneumonia) 01/13/2017        Date: 1/16/2017       Level of Hinduism/Spiritual Activity:  []         Involved in ania tradition/spiritual practice    []         Not involved in ania tradition/spiritual practice  []         Spiritually oriented    []         Claims no spiritual orientation    []         seeking spiritual identity  []         Feels alienated from Baptism practice/tradition  []         Feels angry about Baptism practice/tradition  []         Spirituality/Baptism tradition a resource for coping at this time.   [x]         Not able to assess due to medical condition    Services Provided Today:  [x]         crisis intervention    []         reading Scriptures  [x]         spiritual assessment    [x]         prayer  []         empathic listening/emotional support  []         rites and rituals (cite in comments)  []         life review     []         Baptism support  []         theological development   []         advocacy  []         ethical dialog     []         blessing  []         bereavement support    []         support to family  []         anticipatory grief support   []         help with AMD  []         spiritual guidance    []         meditation      Spiritual Care Needs  []         Emotional Support  [] Spiritual/Yazdanism Care  []         Loss/Adjustment  []         Advocacy/Referral                /Ethics  []         No needs expressed at               this time  []         Other: (note in               comments)  [address]  []         Follow up visits with               pt/family  []         Provide materials  []         Schedule sacraments  []         Contact Community               Clergy  []         Follow up as needed  []         Other: (note in               comments)     Comments: Patient was intubated and asleep. Silent prayer was offered.     [address]

## 2017-01-16 NOTE — PROGRESS NOTES
Patient attempted on PS trial. Patient's RR increased to 40 on PS 14. Patient placed back on previous settings. Vent checked and alarms set and functioning properly.

## 2017-01-16 NOTE — PROGRESS NOTES
Care Daily Progress Note: 1/16/2017  Admission Date: 1/13/2017     The patient's chart is reviewed and the patient is discussed with the staff. 80 y.o. brought to the ER by EMS after son noted during phone conversation that she was not responding / functioning normally. When he arrived from [place] the house was locked and the police who had to break down the doors. Patient was found lying half off the bed with her severely mentally handicapped daughter Lucia Haas in the bed next to the patient. She was unresponsive, O2 sat t in the 70s, was nasally intubated and brought to the ER. Son reports that patient takes no prescription medications and is not aware of any underlying health issues. WBC is elevated at 14.4, CXR with LLL infiltrate, HR in the 120-130s, and initially hypotensive with BP in the 80/50s which has improved with IVF. Lactic acid 6.7. Subjective:     Lying in bed and moves spontaneously with stimulation but not able to follow any commands, keeps eyes tightly closed. Off all sedation. Remains on vent support, son at home with his sister but communicating by phone.     Current Facility-Administered Medications   Medication Dose Route Frequency    dextrose 5% infusion  125 mL/hr IntraVENous CONTINUOUS    hydrocortisone Sod Succ (PF) (SOLU-CORTEF) injection 50 mg  50 mg IntraVENous Q12H    0.9% sodium chloride infusion 250 mL  250 mL IntraVENous PRN    vancomycin (VANCOCIN) IV ~ pharmacy dosing/monitoring  500 mg IntraVENous Rx Dosing/Monitoring    pantoprazole (PROTONIX) 40 mg in sodium chloride 0.9 % 10 mL injection  40 mg IntraVENous DAILY    LORazepam (ATIVAN) injection 0.5 mg  0.5 mg IntraVENous Q4H PRN    sodium chloride (NS) flush 5-10 mL  5-10 mL IntraVENous PRN    sodium chloride (NS) flush 5-10 mL  5-10 mL IntraVENous Q8H    sodium chloride (NS) flush 5-10 mL  5-10 mL IntraVENous PRN    acetaminophen (TYLENOL) suppository 650 mg  650 mg Rectal Q4H PRN    piperacillin-tazobactam (ZOSYN) 3.375 g in 0.9% sodium chloride (MBP/ADV) 100 mL  3.375 g IntraVENous Q12H    enoxaparin (LOVENOX) injection 60 mg  60 mg SubCUTAneous Q24H       Review of Systems  Unobtainable due to patient status. Objective:     Vitals:    01/16/17 1459 01/16/17 1500 01/16/17 1502 01/16/17 1503   BP: 123/73      Pulse:  82 88 83   Resp:  27 24 25   Temp:       SpO2:  100% 100% 100%   Weight:       Height:           Intake and Output:   01/14 1901 - 01/16 0700  In: [facility] [I.V.:4700]  Out: 0 [Urine:665]       Physical Exam:          Constitutional:  intubated and mechanically ventilated, thin, elderly on vent support  EENMT:  Sclera clear, pupils equal, oral mucosa moist  Respiratory: few anterior crackles  Cardiovascular:  RRR with no M,G,R;  Gastrointestinal:  soft with positive bowel sounds present, NG to LIS with green gastric contents  Musculoskeletal:  warm with no cyanosis, no lower leg edema  Skin:  no jaundice or ecchymosis  Neurologic: spontaneous right arm movements with stimulation  Psychiatric:  Arouses with stimulation but not able to follow commands    LINES:  ETT, peripheral sites, NG, mckeon    DRIPS:  D5W 125ml/hr    CHEST XRAY 1/16/17:        Ventilator Settings  Mode FIO2 Rate Tidal Volume Pressure PEEP   Pressure control  30 %    0 ml  15 cm H2O  8 cm H20      Peak airway pressure: 27 cm H2O   Minute ventilation: 12.8 l/min     ABG:   Recent Labs      01/16/17   0301  01/15/17   0315  01/14/17   0308   PH  7.38  7.40  7.43   PCO2  24*  20*  24*   PO2  113*  139*  129*   HCO3  14*  12*  16*        LAB  No results for input(s): GLUCPOC in the last 72 hours.     No lab exists for component: [place]  Recent Labs      01/16/17   0430  01/15/17   0335  01/14/17   0713  01/13/17   1715   WBC  19.0*  20.0*  14.9*  14.4*   HGB  8.7*  7.6*  8.7*  10.5*   HCT  27.1*  24.7*  28.3*  34.3*   PLT  229  282  254  370   INR   --    --    --   1.0     Recent Labs      01/16/17   1486 01/15/17   0335  01/14/17 0448  01/13/17   1715   NA  149*  152*  153*  150*   K  3.9  3.9  3.9  4.4   CL  117*  121*  121*  116*   CO2  17*  14*  17*  18*   GLU  223*  102*  96  112*   BUN  79*  70*  64*  67*   CREA  2.97*  2.93*  2.40*  2.72*   MG  2.3  2.0  2.1  2.8*   PHOS  4.2*  2.1*  2.0*   --    CA  7.7*  7.5*  8.0*  9.3   ALB  1.7*   --   2.0*  2.6*   SGOT  27   --   52*  30     Recent Labs      01/14/17   2037  01/14/17   1245  01/14/17 0448   LAC  1.7  2.2*  2.2*         Assessment:  (Medical Decision Making)     Hospital Problems  Date Reviewed: 1/16/2017          Codes Class Noted POA    Hypernatremia ICD-10-CM: E87.0  ICD-9-CM: 276.0  1/14/2017 Yes    Na down to 149--continue D5W    Anemia ICD-10-CM: D64.9  ICD-9-CM: 285.9  1/14/2017 Yes    Hemoglobin remains low--8.7    Renal failure ICD-10-CM: N19  ICD-9-CM: 586  1/14/2017 Yes    creatinine trending up to 2.97    UTI (urinary tract infection) ICD-10-CM: N39.0  ICD-9-CM: 599.0  1/14/2017 Yes    Continue antibiotics    Acute respiratory failure (HCC) ICD-10-CM: J96.00  ICD-9-CM: 518.81  1/13/2017 Yes    On vent support    Anoxic brain injury ([facility] [address] ICD-10-CM: G93.1  ICD-9-CM: 348.1  1/13/2017 Yes    unchanged    Hypotension ICD-10-CM: I95.9  ICD-9-CM: 458.9  1/13/2017 Yes    Hemodynamically stable    Hypoxemia ICD-10-CM: R09.02  ICD-9-CM: 799.02  1/13/2017 Yes    Sat acceptable on vent    * (Principal)CAP (community acquired pneumonia) ICD-10-CM: J18.9  ICD-9-CM: 273  1/13/2017 Yes    Continue current      DVT right:  On Lovenox therapeutic    Plan:  (Medical Decision Making)     --Zosyn, Vancomycin day 4  --Blood cultures:  1/2 GPC on 1/13/17, repeat cultures 1/16 pending  --Solu Cortef 50mg q12h  --Palliative Care following  --Na down to 149--continue D5W  --Therapeutic Lovenox for right LE DVT    More than 50% of the time documented was spent in face-to-face contact with the patient and in the care of the patient on the floor/unit where the patient is located. Louisa Manley NP      Lungs:  CTA B, no w/r/r. Heart:  RRR with no Murmur/Rubs/Gallops    Additional Comments:    Patient with metabolic acidosis. Being covered for pneumonia. Will cont vent support. Give bicarb bolus x 1 now. F/u blood cultures for bacteremia. I have spoken with and examined the patient. I agree with the above assessment and plan as documented.     Priscilla Montes MD

## 2017-01-16 NOTE — CONSULTS
Palliative Care    Patient: Maria De Jesus Sesay MRN: [id number]  SSN: [id number]    YOB: 1930  Age: 80 y.o. Sex: female       Date of Request: 1/16/2017  Date of Consult:  1/16/2017  Reason for Consult:  goals of care  Requesting Physician: Nathalie Estrada NP     Assessment/Plan:     Principal Diagnosis:    Debility, Unspecified  R53.81  Additional Diagnoses:   · Acute Respiratory Failure, Unspecified  J96.00  · Altered Mental Status R41.82  · Fatigue, Lethargy  R53.83  · Frailty  R54  · Counseling, Encounter for Medical Advice  Z71.9  · Encounter for Palliative Care  Z51.5    Palliative Performance Scale (PPS):  PPS: 30    Medical Decision Making:   Reviewed and summarized notes from admission to present   Discussed case with appropriate providers- ICU IDT  Reviewed laboratory and x-ray data from admission to present     Patient resting in bed on the ventilator. Minimally responsive on no sedation. Called and spoke with patient's son, Elisabet Malave. He has excellent understanding of patient's current condition. Discussed that we would continue efforts of weaning ventilator, but patient's mental status will be a very important part of patient's recovery and establishing goals/plan of care. He verbalizes understanding. He is very concerned about caring for his sister with Down Syndrome, and is working on this. Will continue to follow. Will discuss findings with members of the interdisciplinary team.      Thank you for this referral.   The Palliative Care team is available from 8 am to 4:30 pm Monday-Friday. Medical management during other hours is per the primary attending service.        .    Subjective:     History obtained from:  Family, Care Provider and Chart    Chief Complaint: Respiratory failure, hypotension  History of Present Illness:  Ms Yohannes Kingston is an 81 yo  female with no significant PMH who presented to the ER from home on 1/13/2017 after being found unresponsive at home by police and family. Per reports, pt's son spoke with her on 1/11, and felt she was confused. He was then unable to contact pt, so he drove from [place] to check on her. Son was unable to enter the house, and the police had to break the door down. Pt was found unresponsive next to her mentally handicapped daughter. She was noted to be incontinent and hypoxic. Pt was nasally intubated and transported to the ER. Work up revealed hypotension, LA of 6.7, CXR with LLL infiltrate, elevated WBC count, and UA positive. Pt was admitted for further management. She has remained intubated and ventilated since admission. Advance Directive: No       Code Status:  DNR            Health Care Power of : No - Patient does not have a [address]. No past medical history on file. No past surgical history on file. No family history on file. Social History   Substance Use Topics    Smoking status: Not on file    Smokeless tobacco: Not on file    Alcohol use Not on file     Prior to Admission medications    Not on File       No Known Allergies     Review of Systems:  Review of systems not obtained due to patient factors- poorly responsive, intubated and ventilated      Objective:     Visit Vitals    /66    Pulse 71    Temp 96.9 °F (36.1 °C)    Resp 22    Ht 5' 4\" (1.626 m)    Wt 67.5 kg (148 lb 13 oz)    SpO2 100%    BMI 25.54 kg/m2        Physical Exam:    General:  Poorly responsive, intubated and ventilated. Restles   Eyes:  Conjunctivae/corneas clear    Nose: Nares normal. Septum midline. NG tube   Neck: Supple, symmetrical, trachea midline   Lungs:   Coarse bilaterally   Heart:  Regular rate and rhythm   Abdomen:   Soft, non-tender, non-distended   Extremities: Normal, atraumatic, no cyanosis. Generalized edema   Skin: Skin color, texture, turgor normal. Scattered bruising    Neurologic: Withdraws to painful stimuli, does not follow commands.    Psych: Poorly responsive       Assessment: Hospital Problems  Date Reviewed: 1/14/2017          Codes Class Noted POA    Hypernatremia ICD-10-CM: E87.0  ICD-9-CM: 276.0  1/14/2017 Unknown        Anemia ICD-10-CM: D64.9  ICD-9-CM: 285.9  1/14/2017 Unknown        Renal failure ICD-10-CM: N19  ICD-9-CM: 498  1/14/2017 Unknown        UTI (urinary tract infection) ICD-10-CM: N39.0  ICD-9-CM: 599.0  1/14/2017 Unknown        Acute respiratory failure ([facility] ICD-10-CM: J96.00  ICD-9-CM: 518.81  1/13/2017 Yes        Anoxic brain injury ([facility] ICD-10-CM: G93.1  ICD-9-CM: 348.1  1/13/2017 Yes        Hypotension ICD-10-CM: I95.9  ICD-9-CM: 458.9  1/13/2017 Yes        Hypoxemia ICD-10-CM: R09.02  ICD-9-CM: 799.02  1/13/2017 Yes        * (Principal)CAP (community acquired pneumonia) ICD-10-CM: J18.9  ICD-9-CM: 391  1/13/2017 Yes              Signed By: Dustin Velez NP     January 16, 2017

## 2017-01-16 NOTE — PROGRESS NOTES
Bedside shift change report given to Pietro Nath  (oncoming nurse) by Rajendra Delcid RN  (offgoing nurse). Report included the following information SBAR, Kardex, ED Summary, OR Summary, Procedure Summary, Intake/Output, Recent Results, Med Rec Status and Cardiac Rhythm NSR. Dual skin assessment performed, no new deficits noted. Pt resting in bed NAD. Vs and cardiac monitoring in place. Respirations even and unlabored. Pt with no complaints at this time.

## 2017-01-17 NOTE — PROGRESS NOTES
Ventilator check complete; patient has a #7.5 ET tube secured at the 22 at the lip. Patient is not able to follow commands. Breath sounds are diminished. Trachea is midline, Negative for subcutaneous air, and chest excursion is symmetric. Patient is also Negative for cyanosis and is Negative for pitting edema. All alarms are set and audible. Resuscitation bag is at the head of the bed.       Ventilator Settings  Mode FIO2 Rate Tidal Volume Pressure PEEP I:E Ratio   Pressure control  30 %    0 ml  15 cm H2O  8 cm H20  1:2.0      Peak airway pressure: 27 cm H2O   Minute ventilation: 12.7 l/min     ABG:   Recent Labs      01/16/17   0301  01/15/17   0315  01/14/17   0308   PH  7.38  7.40  7.43   PCO2  24*  20*  24*   PO2  113*  139*  129*   HCO3  14*  12*  16*         [facility]

## 2017-01-17 NOTE — PROGRESS NOTES
Chart reviewed. Pt just extubated today with son at bedside. They do not want pt re-intubated per nursing notes. Un sure of pt's prognosis at this point. Will follow for any needs or d/c needs. Will attempt to see pt and son in am. Primary RN states that son is caring for pt's daughter presently.

## 2017-01-17 NOTE — PROGRESS NOTES
Palliative Care Progress Note    Patient: Davida Mcadams MRN: [id number]  SSN: [id number]    YOB: 1930  Age: 80 y.o. Sex: female       Assessment/Plan:     Chief Complaint/Interval History: Patient remains intubated with AMS     Principal Diagnosis:    · Altered Mental Status R41.82    Additional Diagnoses:   · Acute Respiratory Failure, Unspecified  J96.00  · Failure to Thrive  R62.7  · Fatigue, Lethargy  R53.83  · Frailty  R54  · Counseling, Encounter for Medical Advice  Z71.9  · Encounter for Palliative Care  Z51.5    Palliative Performance Scale (PPS)  PPS: 20 (Simultaneous filing. User may not have seen previous data.)    Medical Decision Making:   Reviewed and summarized notes over previous 24 hours. Discussed case with appropriate providers: ICU IDT; primary RN  Reviewed laboratory and x-ray data: CBC, CMP    Patient resting in bed, remains intubated. Patient attempts to open eyes to voice and resists me trying to open her eyes, but she will not follow any commands. Patient tolerating CPAP very well, and could possibly be extubated soon. Call placed to Yue Person to discuss, no answer and voicemail left. Will continue to follow. Will discuss findings with members of the interdisciplinary team.         More than 50% of this 25 minute visit was spent counseling and coordination of care as outlined above. Subjective:     Review of Systems:  Review of systems not obtained due to patient factors: intubated and minimally responsive     Objective:     Visit Vitals    /66    Pulse 67    Temp 96.6 °F (35.9 °C)    Resp 28    Ht 5' 4\" (1.626 m)    Wt 68.3 kg (150 lb 9.2 oz)    SpO2 100%    BMI 25.85 kg/m2       Physical Exam:    General:  Frail and debilitated appearing. No acute distress. Eyes:  Deferred. Nose: Nares normal. Septum midline. Neck: Supple, symmetrical, trachea midline. Lungs:   Diminished and faintly coarse bilaterally, unlabored. Heart:  Regular rate and rhythm. Abdomen:   Soft, non-tender, non-distended. Extremities: Normal, atraumatic, no cyanosis. Generalized 1+ edema. Skin: Skin fragile with scattered ecchymosis. No rash. Neurologic: Minimally responsive. Not following commands. Psych: Unable to assess.      Signed By: Rinku Carlos NP     January 17, 2017

## 2017-01-17 NOTE — PROGRESS NOTES
Care Daily Progress Note: 1/17/2017  Admission Date: 1/13/2017     The patient's chart is reviewed and the patient is discussed with the staff. 80 y.o. brought to the ER by EMS after son noted during phone conversation that she was not responding / functioning normally. When he arrived from [place] the house was locked and the police who had to break down the doors. Patient was found lying half off the bed with her severely mentally handicapped daughter Ga Keene in the bed next to the patient. She was unresponsive, O2 sat t in the 70s, was nasally intubated and brought to the ER. Son reports that patient takes no prescription medications and is not aware of any underlying health issues. WBC is elevated at 14.4, CXR with LLL infiltrate, HR in the 120-130s, and initially hypotensive with BP in the 80/50s which has improved with IVF. Lactic acid 6.7. Subjective:     Lying in bed and moves spontaneously with stimulation but not able to follow any commands, keeps eyes tightly closed. Off all sedation. Remains on vent support, son at home with his sister but communicating by phone.     Current Facility-Administered Medications   Medication Dose Route Frequency    dextrose 5% infusion  125 mL/hr IntraVENous CONTINUOUS    hydrocortisone Sod Succ (PF) (SOLU-CORTEF) injection 50 mg  50 mg IntraVENous Q12H    0.9% sodium chloride infusion 250 mL  250 mL IntraVENous PRN    vancomycin (VANCOCIN) IV ~ pharmacy dosing/monitoring  500 mg IntraVENous Rx Dosing/Monitoring    pantoprazole (PROTONIX) 40 mg in sodium chloride 0.9 % 10 mL injection  40 mg IntraVENous DAILY    LORazepam (ATIVAN) injection 0.5 mg  0.5 mg IntraVENous Q4H PRN    sodium chloride (NS) flush 5-10 mL  5-10 mL IntraVENous PRN    sodium chloride (NS) flush 5-10 mL  5-10 mL IntraVENous Q8H    sodium chloride (NS) flush 5-10 mL  5-10 mL IntraVENous PRN    acetaminophen (TYLENOL) suppository 650 mg  650 mg Rectal Q4H PRN    piperacillin-tazobactam (ZOSYN) 3.375 g in 0.9% sodium chloride (MBP/ADV) 100 mL  3.375 g IntraVENous Q12H    enoxaparin (LOVENOX) injection 60 mg  60 mg SubCUTAneous Q24H       Review of Systems  Unobtainable due to patient status. Objective:     Vitals:    01/17/17 1000 01/17/17 1100 01/17/17 1157 01/17/17 1201   BP: 118/66 120/64     Pulse: 67 64 69    Resp: 28 25 30    Temp:    97.1 °F (36.2 °C)   SpO2: 100% 99% 97%    Weight:       Height:           Intake and Output:   01/15 1901 - 01/17 0700  In: 5002.1 [I.V.:5002.1]  Out: 3736 [Urine:985]       Physical Exam:          Constitutional:  intubated and mechanically ventilated, thin, elderly on vent support  EENMT:  Sclera clear, pupils equal, oral mucosa moist  Respiratory: few anterior crackles  Cardiovascular:  RRR with no M,G,R;  Gastrointestinal:  soft with positive bowel sounds present, NG to LIS with green gastric contents  Musculoskeletal:  warm with no cyanosis, no lower leg edema  Skin:  no jaundice or ecchymosis  Neurologic: spontaneous right arm movements with stimulation  Psychiatric:  Arouses with stimulation but not able to follow commands    LINES:  ETT, peripheral sites, NG, mckeon    DRIPS:  D5W 125ml/hr    CHEST XRAY     1/17:          1/16/17:        Ventilator Settings  Mode FIO2 Rate Tidal Volume Pressure PEEP   Pressure support  30 %    0 ml  10 cm H2O  14 cm H20      Peak airway pressure: 23 cm H2O   Minute ventilation: 12.9 l/min     ABG:   Recent Labs      01/17/17   0350  01/16/17   0301  01/15/17   0315   PH  7.44  7.38  7.40   PCO2  26*  24*  20*   PO2  106*  113*  139*   HCO3  17*  14*  12*        LAB  No results for input(s): GLUCPOC in the last 72 hours.     No lab exists for component: [place]  Recent Labs      01/17/17   0515  01/16/17   0430  01/15/17   0335   WBC  18.1*  19.0*  20.0*   HGB  9.9*  8.7*  7.6*   HCT  29.8*  27.1*  24.7*   PLT  159  229  282     Recent Labs      01/17/17   0515  01/16/17   0430  01/15/17   0335 NA  142  149*  152*   K  3.6  3.9  3.9   CL  109*  117*  121*   CO2  19*  17*  14*   GLU  234*  223*  102*   BUN  75*  79*  70*   CREA  2.52*  2.97*  2.93*   MG  2.1  2.3  2.0   PHOS  2.8  4.2*  2.1*   CA  7.6*  7.7*  7.5*   ALB  1.5*  1.7*   --    SGOT  19  27   --      Recent Labs      01/14/17   2037   LAC  1.7         Assessment:  (Medical Decision Making)     Hospital Problems  Date Reviewed: 1/16/2017          Codes Class Noted POA    Hypernatremia ICD-10-CM: E87.0  ICD-9-CM: 276.0  1/14/2017 Yes    Na down to 142--decrease D5W    Anemia ICD-10-CM: D64.9  ICD-9-CM: 285.9  1/14/2017 Yes    Hemoglobin remains low--8.7    Renal failure ICD-10-CM: N19  ICD-9-CM: 230  1/14/2017 Yes    Creatinine 2.52- down    UTI (urinary tract infection) ICD-10-CM: N39.0  ICD-9-CM: 599.0  1/14/2017 Yes    Continue antibiotics    Acute respiratory failure (HCC) ICD-10-CM: J96.00  ICD-9-CM: 518.81  1/13/2017 Yes    On vent support    Anoxic brain injury ([facility] ICD-10-CM: G93.1  ICD-9-CM: 348.1  1/13/2017 Yes    Unchanged. Hypotension ICD-10-CM: I95.9  ICD-9-CM: 458.9  1/13/2017 Yes    Hemodynamically stable    Hypoxemia ICD-10-CM: R09.02  ICD-9-CM: 799.02  1/13/2017 Yes    Sat acceptable on vent    * (Principal)CAP (community acquired pneumonia) ICD-10-CM: J18.9  ICD-9-CM: 537  1/13/2017 Yes    Continue current        DVT right:  On Lovenox therapeutic    Plan:  (Medical Decision Making)     --Zosyn, Vancomycin day 5  --Blood cultures:  1/2 GPC on 1/13/17, repeat cultures 1/16 pending  --Solu Cortef 50mg q12h- decrease  --Palliative Care following  --Decrease IVF  --Therapeutic Lovenox for right LE DVT  --Wean PSV and cut ETT to decrease airflow resistance. More than 50% of the time documented was spent in face-to-face contact with the patient and in the care of the patient on the floor/unit where the patient is located.     Alfred Antonio MD

## 2017-01-17 NOTE — PROGRESS NOTES
Problem: Ventilator Management  Goal: *Normal spontaneous ventilation  Outcome: Not Met  Ventilator check complete; patient has a #7. 0 ET tube secured at the 22 at the lip. Patient is not sedated. Patient is not able to follow commands. Breath sounds are clear. Trachea is midline, Negative for subcutaneous air, and chest excursion is symmetric. Patient is also Negative for cyanosis. All alarms are set and audible. Resuscitation bag is at the head of the bed.        Ventilator Settings  Mode FIO2 Rate Tidal Volume Pressure PEEP I:E Ratio   Pressure support  30 %    0 ml  10 cm H2O  8 cm H20  1:2.0       Peak airway pressure: 25 cm H2O   Minute ventilation: 18.3 l/min      ABG:   Recent Labs      01/17/17   0350  01/16/17   0301  01/15/17   0315   PH  7.44  7.38  7.40   PCO2  26*  24*  20*   PO2  106*  113*  139*   HCO3  17*  14*  12*           Bruno Holden, RT

## 2017-01-17 NOTE — PROGRESS NOTES
Son and daughter in law at bedside. Reconfirmed that patient will not be reintubated after extubation. Dr. Reggie Cheung at bedside speaking with family.

## 2017-01-17 NOTE — CDMP QUERY
Please clarify if this patient is being treated/managed for:    Sepsis present on admission in the setting of UTI and pneumonia in a patient with tachycardia, elevated WBC's  lactic acid and pro calcitonin, treated with IV Fluids, Zosyn and Vancomycin. =>Other Explanation of clinical findings  =>Unable to Determine (no explanation of clinical findings)    The medical record reflects the following:    Risk Factors: 86 f  w/ UTI and Pneumonia    Clinical Indicators: tachycardia, WBC 14.4 , Lactic 4.2, Procalcitonin  6.3    Treatment: Iv fluid boluses, Vancomycin and Zosyn. Please clarify and document your clinical opinion in the progress notes and discharge summary including the definitive and/or presumptive diagnosis, (suspected or probable), related to the above clinical findings. Please include clinical findings supporting your diagnosis.     Thanks,  Alexander Hawley RN, CDS  Compliant Documentation Management Program  [phone]

## 2017-01-17 NOTE — PROGRESS NOTES
Problem: Ventilator Management  Goal: *Normal spontaneous ventilation  Outcome: Resolved/Met Date Met:  01/17/17  Respiratory Mechanics completed and are as follows:  Weaning Parameters  Spontaneous Breathing Trial Complete: Yes  Gu Agitation Sedation Scale (RASS): Moderate sedation  Patient extubated to a 4L NC. Patient is not able to communicate and is negative for stridor. Breath sounds are coarse. No complications with extubation.       Emily Holden, RT

## 2017-01-17 NOTE — WOUND CARE
Patient seen per request as she was found down at home and has skin issues. Noted left buttock near fold and lateral right buttock has open areas. Has friction and moisture but did not see any DTI or pressure related areas. Has right heel pressure soar/blister from home as well, almost clear blister but small amount of blood. It remains intact and Prevalon boot is in place. Discussed with nurse, may place Mepilex 4x4 to site to protect or if it breaks open. Left heel slight boggy but intact. Is in CCU on air mattress. Wound team will monitor and assist as needed.

## 2017-01-17 NOTE — PROGRESS NOTES
Tolerating NC well. Respirations even, slightly tachypnic. SAT 94% on 4L NC. Now opens eyes spontaneously and follows simple commands. Much more interactive when family at bedside.

## 2017-01-18 PROBLEM — N17.9 ACUTE RENAL FAILURE (ARF) (HCC): Status: ACTIVE | Noted: 2017-01-01

## 2017-01-18 PROBLEM — A41.9 SEPSIS (HCC): Status: ACTIVE | Noted: 2017-01-01

## 2017-01-18 PROBLEM — N39.0 UTI (URINARY TRACT INFECTION): Status: RESOLVED | Noted: 2017-01-01 | Resolved: 2017-01-01

## 2017-01-18 PROBLEM — N19 RENAL FAILURE: Status: RESOLVED | Noted: 2017-01-01 | Resolved: 2017-01-01

## 2017-01-18 NOTE — PROGRESS NOTES
Patient was dozing and awakened. Patient spoke but was difficult to understand. Support including prayer was provided.       [employer]

## 2017-01-18 NOTE — PROGRESS NOTES
TRANSFER - OUT REPORT:    Verbal report given to Renee Yung RN(name) on Maria De Jesus Sesay  being transferred to [facility](unit) for routine progression of care       Report consisted of patients Situation, Background, Assessment and   Recommendations(SBAR). Information from the following report(s) SBAR, Kardex, Intake/Output, MAR, Recent Results, Med Rec Status and Cardiac Rhythm nsr was reviewed with the receiving nurse. Lines:   Peripheral IV 01/13/17 Right Antecubital (Active)   Site Assessment Clean, dry, & intact 1/17/2017  7:00 PM   Phlebitis Assessment 0 1/17/2017  7:00 PM   Infiltration Assessment 0 1/17/2017  7:00 PM   Dressing Status Clean, dry, & intact 1/17/2017  7:00 PM   Dressing Type Tape;Transparent 1/17/2017  7:00 PM   Hub Color/Line Status Patent; Flushed 1/17/2017  7:00 PM   Alcohol Cap Used No 1/17/2017  7:00 PM       Peripheral IV 01/13/17 Left Other(comment) (Active)   Site Assessment Clean, dry, & intact 1/17/2017  7:00 PM   Phlebitis Assessment 0 1/17/2017  7:00 PM   Infiltration Assessment 0 1/17/2017  7:00 PM   Dressing Status Clean, dry, & intact 1/17/2017  7:00 PM   Dressing Type Tape;Transparent 1/17/2017  7:00 PM   Hub Color/Line Status Patent; Flushed 1/17/2017  7:00 PM   Action Taken Open ports on tubing capped 1/16/2017  7:59 AM   Alcohol Cap Used No 1/17/2017  7:00 PM       Peripheral IV 01/14/17 Left Forearm (Active)   Site Assessment Clean, dry, & intact 1/17/2017  7:00 PM   Phlebitis Assessment 0 1/17/2017  7:00 PM   Infiltration Assessment 0 1/17/2017  7:00 PM   Dressing Status Clean, dry, & intact 1/17/2017  7:00 PM   Dressing Type Transparent;Tape 1/17/2017  7:00 PM   Hub Color/Line Status Patent; Flushed 1/17/2017  7:00 PM   Action Taken Open ports on tubing capped 1/16/2017  7:59 AM   Alcohol Cap Used No 1/17/2017  7:00 PM        Opportunity for questions and clarification was provided.       Patient transported with:   O2 @ 4 liters  Tech

## 2017-01-18 NOTE — PROGRESS NOTES
Rosenda Doyle  Admission Date: 1/13/2017             Daily Progress Note: 1/18/2017    The patient's chart is reviewed and the patient is discussed with the staff. Subjective:     Patient barely responsive. Tries to open eyes but no attempt to communicate. Congested cough. Current Facility-Administered Medications   Medication Dose Route Frequency    hydrocortisone Sod Succ (PF) (SOLU-CORTEF) injection 25 mg  25 mg IntraVENous Q12H    ceFAZolin in 0.9% NS (ANCEF) IVPB soln 2 g  2 g IntraVENous Q12H    dextrose 5% infusion  50 mL/hr IntraVENous CONTINUOUS    0.9% sodium chloride infusion 250 mL  250 mL IntraVENous PRN    pantoprazole (PROTONIX) 40 mg in sodium chloride 0.9 % 10 mL injection  40 mg IntraVENous DAILY    LORazepam (ATIVAN) injection 0.5 mg  0.5 mg IntraVENous Q4H PRN    sodium chloride (NS) flush 5-10 mL  5-10 mL IntraVENous PRN    sodium chloride (NS) flush 5-10 mL  5-10 mL IntraVENous Q8H    sodium chloride (NS) flush 5-10 mL  5-10 mL IntraVENous PRN    acetaminophen (TYLENOL) suppository 650 mg  650 mg Rectal Q4H PRN    enoxaparin (LOVENOX) injection 60 mg  60 mg SubCUTAneous Q24H         Objective:     Vitals:    01/17/17 2254 01/18/17 0317 01/18/17 0436 01/18/17 0818   BP:  137/77  147/68   Pulse:  82  81   Resp: 28 24  18   Temp:  97.2 °F (36.2 °C)  97.4 °F (36.3 °C)   SpO2:  95%  95%   Weight:   154 lb 9.6 oz (70.1 kg)    Height:         Intake and Output:   01/16 1901 - 01/18 0700  In: 2772.1 [I.V.:2772.1]  Out: 2400 [Urine:2250]       Physical Exam:   Constitutional:  the patient is well developed and in no acute distress  HEENT:  Sclera clear, pupils equal, oral mucosa moist  Lungs: rhonchi with cough. Respirations even and not labored. Wearing 2 liter cannula  Cardiovascular:  RRR with noted systolic murmur. Abd/GI: soft and appears non-tender; with positive bowel sounds. Ext: warm without cyanosis. There is right leg edema. Some in hands as well. Musculoskeletal: moves all four extremities with equal but weak strength  Skin:  no jaundice or rashes, no wounds   Neuro:opens eyes and appears to focus - no attempt to speak or follow commands  Musculoskeletal: can't ambulate. No deformity  Psychiatric: Cannot assess    ROS: cannot assess  Lines: peripheral IV, mckeon    CHEST XRAY:       LAB  Recent Labs      01/18/17   0541  01/17/17   0515  01/16/17   0430   WBC  17.9*  18.1*  19.0*   HGB  10.5*  9.9*  8.7*   HCT  31.3*  29.8*  27.1*   PLT  132*  159  229     Recent Labs      01/18/17   0541  01/17/17   0515  01/16/17   0430   NA  144  142  149*   K  3.0*  3.6  3.9   CL  110*  109*  117*   CO2  20*  19*  17*   GLU  122*  234*  223*   BUN  64*  75*  79*   CREA  2.07*  2.52*  2.97*   MG  2.2  2.1  2.3   PHOS  2.8  2.8  4.2*   ALB  1.5*  1.5*  1.7*   SGOT  22  19  27     Recent Labs      01/17/17   0350  01/16/17   0301   PH  7.44  7.38   PCO2  26*  24*   PO2  106*  113*   HCO3  17*  14*         Assessment:  (Medical Decision Making)     Hospital Problems  Date Reviewed: 1/16/2017          Codes Class Noted POA    Sepsis ([facility] ICD-10-CM: A41.9  ICD-9-CM: 038.9, 995.91  1/18/2017 Yes    Associated with pneumonia    Acute renal failure (ARF) ([facility] ICD-10-CM: N17.9  ICD-9-CM: 584.9  1/18/2017 Yes    Better with hydration    Acute deep vein thrombosis (DVT) of right lower extremity ([facility] ICD-10-CM: I82.401  ICD-9-CM: 453.40  1/16/2017 Yes    Overview Signed 1/16/2017  4:05 PM by Mckayla Deshpande NP     1/13/16 Ultrasound:    1. Positive for deep venous thrombosis of the right peroneal vein. 2. No evidence of deep venous thrombosis of the left lower extremity. On lovenox    Bacteremia due to Gram-positive bacteria ICD-10-CM: A49.9  ICD-9-CM: 790.7  1/16/2017 Yes    Staph aureus. Repeat blood cultures neg    Hypernatremia ICD-10-CM: E87.0  ICD-9-CM: 276.0  1/14/2017 Yes    improving    Anemia ICD-10-CM: D64.9  ICD-9-CM: 285.9  1/14/2017 Yes    Better.  Low albumin - ? chronic    Acute respiratory failure with hypoxia (HCC) ICD-10-CM: J96.01  ICD-9-CM: 518.81  1/13/2017 Yes    Successful extubation. On low flow cannula. Anoxic brain injury [facility]) ICD-10-CM: G93.1  ICD-9-CM: 348.1  1/13/2017 Yes    Found at home unresponsive with sat in the 70s    Hypotension ICD-10-CM: I95.9  ICD-9-CM: 458.9  1/13/2017 Yes    resolved    Hypoxemia ICD-10-CM: R09.02  ICD-9-CM: 799.02  1/13/2017 Yes    Low flow oxygen    * (Principal)CAP (community acquired pneumonia) ICD-10-CM: J18.9  ICD-9-CM: 269  1/13/2017 Yes    Blood culture with staph aureus          Plan:  (Medical Decision Making)   1. Will stop solucortef. Now normotensive  2. Day 6 abx - currently on ancef with staph aureus in the blood. WBC down some but still elevated - on steroids which may be affecting  3. No nutrition but too lethargic to take anything by mouth safely  4. Continue IV fluids but change to 0.45 NS since Na is better  5. Has murmur on exam - no echo for review. She is a DNR and how with what appears to be hypoxic/anoxic injury. No family in room to discuss goals of care. Was living with mentally handicapped child. 6. Leave mckeon for now due to mental status  7. lovenox for DVT  8. Supplement potassium. Recheck tomorrow    Darrel Dose, NP    More than 50% of time documented was spent face-to-face contact with the patient and in the care of the patient on the floor/unit where the patient is located. Lungs:  Decreased breath sounds  Heart:  RRR with no Murmur/Rubs/Gallops    Additional Comments: Iv antibx, probably needs rehab    I have spoken with and examined the patient. I agree with the above assessment and plan as documented.     Alejandra Reddy MD

## 2017-01-18 NOTE — PROGRESS NOTES
Palliative Care Progress Note    Patient: Maria De Jesus Sesay MRN: [id number]  SSN: [id number]    YOB: 1930  Age: 80 y.o. Sex: female       Assessment/Plan:     Chief Complaint/Interval History: Patient extubated yesterday and transferred to floor. She remains poorly responsive. Principal Diagnosis:    · Altered Mental Status R41.82    Additional Diagnoses:   · Acute Respiratory Failure, Unspecified  J96.00  · Failure to Thrive  R62.7  · Fatigue, Lethargy  R53.83  · Frailty  R54  · Counseling, Encounter for Medical Advice  Z71.9  · Encounter for Palliative Care  Z51.5    Palliative Performance Scale (PPS)  PPS: 20 (Simultaneous filing. User may not have seen previous data.)    Medical Decision Making:   Reviewed and summarized notes over previous 24 hours. Discussed case with appropriate providers: Case Management. Reviewed laboratory and x-ray data: CBC, CMP    Patient resting in bed, no distress noted, no visitors present. Patient extubated yesterday but remains minimally responsive. Patient opens eyes to voice. Patient nods yes that she is in pain, when I asked where she is hurting, she lifted her right arm, but could not localize the pain. As of now, patient not appropriate for therapies. Unsure if patient and family's wishes regarding even temporary artificial nutrition. Will call patient's son to discuss. 1630- Call placed to patient's son, Rosemary Monge, to discuss wishes regarding artificial nutrition. No answer. Will attempt to speak with him tomorrow. Will discuss findings with members of the interdisciplinary team.         More than 50% of this 25 minute visit was spent counseling and coordination of care as outlined above.     Subjective:     Review of Systems:  Review of systems not obtained due to patient factors: poorly responsive     Objective:     Visit Vitals    /68    Pulse 81    Temp 97.4 °F (36.3 °C)    Resp 18    Ht 5' 4\" (1.626 m)    Wt 70.1 kg (154 lb 9.6 oz)  SpO2 95%    BMI 26.54 kg/m2       Physical Exam:    General:  Frail and debilitated appearing. No acute distress. Eyes:  Conjunctivae and corneas clear. Nose: Nares normal. Septum midline. Neck: Supple, symmetrical, trachea midline. Lungs:   Diminished and faintly coarse bilaterally, unlabored. Heart:  Regular rate and rhythm. Abdomen:   Soft, non-tender, non-distended. Extremities: Normal, atraumatic, no cyanosis. Generalized 1+ edema. Skin: Skin fragile with scattered ecchymosis. No rash. Neurologic: Minimally responsive. Psych: Unable to assess.      Signed By: Nnamdi Andrade NP     January 18, 2017

## 2017-01-18 NOTE — PROGRESS NOTES
[facility] CRITICAL CARE OUTREACH NURSE PROGRESS REPORT      SUBJECTIVE: Called to assess patient secondary to recent transfer from CCU. MEWS Score: 1 (01/18/17 0818)  Vitals:    01/17/17 2254 01/18/17 0317 01/18/17 0436 01/18/17 0818   BP:  137/77  147/68   Pulse:  82  81   Resp: 28 24  18   Temp:  97.2 °F (36.2 °C)  97.4 °F (36.3 °C)   SpO2:  95%  95%   Weight:   70.1 kg (154 lb 9.6 oz)    Height:               LAB DATA:    Recent Labs      01/18/17   0541  01/17/17   0515  01/16/17   0430   NA  144  142  149*   K  3.0*  3.6  3.9   CL  110*  109*  117*   CO2  20*  19*  17*   AGAP  14  14  15   GLU  122*  234*  223*   BUN  64*  75*  79*   CREA  2.07*  2.52*  2.97*   GFRAA  29*  23*  19*   GFRNA  24*  19*  16*   CA  8.0*  7.6*  7.7*   MG  2.2  2.1  2.3   PHOS  2.8  2.8  4.2*   ALB  1.5*  1.5*  1.7*   TP  6.1*  5.8*  5.8*   GLOB  4.6*  4.3*  4.1*   AGRAT  0.3*  0.3*  0.4*   ALT  21  24  27        Recent Labs      01/18/17   0541  01/17/17   0515  01/16/17   0430   WBC  17.9*  18.1*  19.0*   HGB  10.5*  9.9*  8.7*   HCT  31.3*  29.8*  27.1*   PLT  132*  159  229          OBJECTIVE: On arrival to room, I found patient to be resting in bed. Eyes closed. Pain Assessment  Pain Intensity 1: 0 (01/18/17 0818)     Pain Intervention(s) 1: Position  Patient Stated Pain Goal: 0                                 ASSESSMENT:  Patient is minimally responsive, although does appear to nod \"yes\" to simple questions. Lungs coarse. Murmur heard. SpO2 92% on O2 @ 3L. HR 71. PLAN:  Will continue to monitor per outreach protocol.

## 2017-01-18 NOTE — PROGRESS NOTES
Date of Outreach Update:  Tere Turner was seen and assessed. MEWS Score: 1 (01/18/17 0818)  Vitals:    01/17/17 2254 01/18/17 0317 01/18/17 0436 01/18/17 0818   BP:  137/77  147/68   Pulse:  82  81   Resp: 28 24  18   Temp:  97.2 °F (36.2 °C)  97.4 °F (36.3 °C)   SpO2:  95%  95%   Weight:   70.1 kg (154 lb 9.6 oz)    Height:             Pain Assessment  Pain Intensity 1: 0 (01/18/17 0818)     Pain Intervention(s) 1: Position  Patient Stated Pain Goal: 0      Previous Outreach assessment has been reviewed. There have been no significant clinical changes since the completion of the last dated Outreach assessment. Will continue to follow up per outreach protocol.     Signed By:   Danyelle Awad RN    January 18, 2017 3:48 PM

## 2017-01-18 NOTE — PROGRESS NOTES
Shift assessment completed. Patient alert orientated to self but unable to assess further. Patient can nod head in response to some questions. 1-2+ edema noted in all extremities. Respirations rapid and labored, but O2 sat WNLd. Bed low, locked, call bell within reach. Side rails up x 2.

## 2017-01-18 NOTE — PROGRESS NOTES
Patient is newly transferred from ICU. She was extubated yesterday, but remains poorly responsive today. At baseline, patient lives at home with a severely mentally handicapped daughter for whom she provides all care. At this time, the daughter is being cared for by patient's son. Based on her current status, we are unable to get PT/OT evaluations, but patient will likely require short-term rehab at discharge. Case Management will continue to follow and make discharge plans as appropriate.     Care Management Interventions  Transition of Care Consult (CM Consult): Discharge Planning  Discharge Durable Medical Equipment: No  Physical Therapy Consult: No  Occupational Therapy Consult: No  Speech Therapy Consult: No  Current Support Network: Own Home  Confirm Follow Up Transport: Family  Plan discussed with Pt/Family/Caregiver: Yes  Freedom of Choice Offered: Yes  Discharge Location  Discharge Placement: Other: (To be determiined.)

## 2017-01-19 PROBLEM — J96.01 ACUTE HYPOXEMIC RESPIRATORY FAILURE (HCC): Status: ACTIVE | Noted: 2017-01-01

## 2017-01-19 NOTE — PROGRESS NOTES
Ms. Cora Kelley has been receiving  support and this morning I was informed by Jennifer Erickson about a family meeting occurring this morning regarding comfort measures.  follow-up is anticipated.      Merline Cough, [address]  Board Certified

## 2017-01-19 NOTE — PROGRESS NOTES
Patient discharging to [facility] this afternoon. Transport arranged through [employer] for 16:15. Patient's family in agreement with the discharge plan.

## 2017-01-19 NOTE — PROGRESS NOTES
Pt respirations 32, sats 91% on 3L. Increased O2 to 4L, pt stilling dropping to 90% when asleep. Increased O2 to 5L O2, able to maintain 93% O2. Respirations shallow, 29/min. RT notified and aware.

## 2017-01-19 NOTE — PROGRESS NOTES
TRANSFER - OUT REPORT:    Verbal report given to Nigel Berger on Rosenda Serve  being transferred to [facility] for change in patient condition(declining health)       Report consisted of patients Situation, Background, Assessment and   Recommendations(SBAR). Information from the following report(s) SBAR was reviewed with the receiving nurse. Lines:       Opportunity for questions and clarification was provided.       Patient transported with:   O2 @ 10 liters

## 2017-01-19 NOTE — PROGRESS NOTES
Dr. Esperanza Mera called to be notified of ABG results. Outreach nurse already notified Dr. Esperanza Mera of ABG results and received accompanying orders.

## 2017-01-19 NOTE — PROGRESS NOTES
Spoke to son and aware mother not doing well and x-ray today noted worse. Agrees with comfort measures. Will stop abx and other treatment.  Awaiting hospice and will taper oxygen to level that may be appropriate for hospice luz marina Cary MD

## 2017-01-19 NOTE — PROGRESS NOTES
Date of Outreach Update:  Mell Willingham was seen and assessed. MEWS Score: 1 (01/18/17 1748)  Vitals:    01/18/17 1748 01/18/17 2022 01/18/17 2237 01/19/17 0350   BP: 150/60 142/82 148/50 145/76   Pulse: 84 82 95 (!) 106   Resp: 20 24 (!) 32 (!) 36   Temp: 97.6 °F (36.4 °C) 97.5 °F (36.4 °C) 98.1 °F (36.7 °C) 98.4 °F (36.9 °C)   SpO2: 99% 95% 91% 97%   Weight:       Height:             Pain Assessment  Pain Intensity 1: 0 (01/19/17 0251)     Pain Intervention(s) 1: Position  Patient Stated Pain Goal: 0      Previous Outreach assessment has been reviewed. Pt respiratory status discussed with Dr. Mable Copeland. Telephone orders received. Pt medicated per orders. Will continue to follow up per outreach protocol.     Signed By:   Joshua Back RN    January 19, 2017 4:06 AM

## 2017-01-19 NOTE — PROGRESS NOTES
Diane Rodríguez  Admission Date: 1/13/2017             Daily Progress Note: 1/19/2017    The patient's chart is reviewed and the patient is discussed with the staff. 80 y.o. brought to the ER by EMS after son noted during phone conversation that she was not responding / functioning normally. When he arrived from [place] the house was locked and the police who had to break down the doors. Patient was found lying half off the bed with her severely mentally handicapped daughter Salvatore Land in the bed next to the patient. She was unresponsive, O2 sat t in the 70s, was nasally intubated and brought to the ER. Son reports that patient takes no prescription medications and is not aware of any underlying health issues. WBC is elevated at 14.4, CXR with LLL infiltrate, HR in the 120-130s, and initially hypotensive with BP in the 80/50s which has improved with IVF. Lactic acid 6.7. +BC staph, on Ancef. CXR with R infiltrate. Pt extubated on 1/17. Now on optiflow. Subjective:     Pt on Optiflow 50L at 60%. More responsive today per nursing  Pt opens eyes to voice and tracks.      Current Facility-Administered Medications   Medication Dose Route Frequency    potassium chloride 20 mEq in 100 ml IVPB  20 mEq IntraVENous Q2H    0.45% sodium chloride infusion  50 mL/hr IntraVENous CONTINUOUS    ceFAZolin in 0.9% NS (ANCEF) IVPB soln 2 g  2 g IntraVENous Q12H    0.9% sodium chloride infusion 250 mL  250 mL IntraVENous PRN    pantoprazole (PROTONIX) 40 mg in sodium chloride 0.9 % 10 mL injection  40 mg IntraVENous DAILY    LORazepam (ATIVAN) injection 0.5 mg  0.5 mg IntraVENous Q4H PRN    sodium chloride (NS) flush 5-10 mL  5-10 mL IntraVENous PRN    sodium chloride (NS) flush 5-10 mL  5-10 mL IntraVENous Q8H    sodium chloride (NS) flush 5-10 mL  5-10 mL IntraVENous PRN    acetaminophen (TYLENOL) suppository 650 mg  650 mg Rectal Q4H PRN    enoxaparin (LOVENOX) injection 60 mg  60 mg SubCUTAneous Q24H       Review of Systems   Unobtainable due to patient status. Objective:     Vitals:    01/19/17 0350 01/19/17 0422 01/19/17 0440 01/19/17 0738   BP: 145/76   132/61   Pulse: (!) 106   99   Resp: (!) 36   16   Temp: 98.4 °F (36.9 °C)   98.1 °F (36.7 °C)   SpO2: 97%  98% 99%   Weight:  151 lb 6.4 oz (68.7 kg)     Height:         Intake and Output:   01/17 1901 - 01/19 0700  In: 0   Out: 2750 [Urine:2750]       Physical Exam:   Constitution:  the patient is well developed and in no acute distress, on optiflow  EENMT:  Sclera clear, pupils equal, oral mucosa moist  Respiratory: coarse breath sounds  Cardiovascular:  RRR , + holosystolic murmur  Gastrointestinal: soft and non-tender; with positive bowel sounds. Musculoskeletal: warm without cyanosis. There is no lower leg edema. Skin:  no jaundice or rashes,  Neurologic: no gross neuro deficits     Psychiatric:  alert     CHEST XRAY: 1/17      LAB  No results for input(s): GLUCPOC in the last 72 hours. No lab exists for component: [place]   Recent Labs      01/18/17   0541  01/17/17   0515   WBC  17.9*  18.1*   HGB  10.5*  9.9*   HCT  31.3*  29.8*   PLT  132*  159     Recent Labs      01/19/17   0627  01/18/17   0541  01/17/17   0515   NA  148*  144  142   K  3.1*  3.0*  3.6   CL  114*  110*  109*   CO2  19*  20*  19*   GLU  76  122*  234*   BUN  53*  64*  75*   CREA  1.76*  2.07*  2.52*   MG   --   2.2  2.1   CA  8.2*  8.0*  7.6*   PHOS   --   2.8  2.8   ALB   --   1.5*  1.5*   TBILI   --   0.3  0.4   ALT   --   21  24   SGOT   --   22  19     Recent Labs      01/19/17   0400  01/17/17   0350   PH  7.44  7.44   PCO2  27*  26*   PO2  68*  106*   HCO3  17*  17*     No results for input(s): LCAD, LAC in the last 72 hours.       Assessment:  (Medical Decision Making)     Hospital Problems  Date Reviewed: 1/19/2017          Codes Class Noted POA    Sepsis (HCC)-on ancef ICD-10-CM: A41.9  ICD-9-CM: 038.9, 995.91  1/18/2017 Yes        Acute renal failure (ARF) (HCC)-improving ICD-10-CM: N17.9  ICD-9-CM: 584.9  1/18/2017 Yes        Acute deep vein thrombosis (DVT) of right lower extremity (HCC)-on lovenox ICD-10-CM: I82.401  ICD-9-CM: 453.40  1/16/2017 Yes    Overview Signed 1/16/2017  4:05 PM by Scarlett Streeter NP     1/13/16 Ultrasound:    1. Positive for deep venous thrombosis of the right peroneal vein. 2. No evidence of deep venous thrombosis of the left lower extremity. Bacteremia due to Gram-positive bacteria-on ancef day 7 ICD-10-CM: A49.9  ICD-9-CM: 790.7  1/16/2017 Yes        Hypernatremia-persistent  ICD-10-CM: E87.0  ICD-9-CM: 276.0  1/14/2017 Yes        Anemia ICD-10-CM: D64.9  ICD-9-CM: 285.9  1/14/2017 Yes        Acute respiratory failure with hypoxia (HCC)-on optiflow ICD-10-CM: J96.01  ICD-9-CM: 518.81  1/13/2017 Yes        Anoxic brain injury (HCC)-palliative care following for goals of care ICD-10-CM: G93.1  ICD-9-CM: 348.1  1/13/2017 Yes        Hypotension-resolved ICD-10-CM: I95.9  ICD-9-CM: 458.9  1/13/2017 Yes        Hypoxemia ICD-10-CM: R09.02  ICD-9-CM: 799.02  1/13/2017 Yes        * (Principal)CAP (community acquired pneumonia)-on ancef ICD-10-CM: J18.9  ICD-9-CM: 902  1/13/2017 Yes              Plan:  (Medical Decision Making)     --on optiflow, wean as tolerated  --continue ancef-day 7  --potassium replacement   --oral care, monitor for bleeding and if persists would hold lovenox  --appreciate palliative care input, goals of care need to be addressed such as feeding tube (PC attempted to discuss this with son yesterday but he was not able to be reached)    More than 50% of the time documented was spent in face-to-face contact with the patient and in the care of the patient on the floor/unit where the patient is located.     MICHELLE Macias    Lungs : decreased effort  Heart S1 and S2 audible, no murmers or rubs appreciated  Oral -- noted blood and thick secretions  Other      Need to clarify goals of care e.g. Tube feeding, etc.    I have spoken with and examined the patient. I have reviewed the history, examination, assessment, and plan and agree with the above. Alon Jose MD      This note was signed electronically.

## 2017-01-19 NOTE — PROGRESS NOTES
Pt resting quietly in bed on Opti-flow. No s/s of acute distress noted. Report given to oncoming RN.

## 2017-01-19 NOTE — DISCHARGE SUMMARY
Discharge Note    Daphney Choi  Admission date:  1/13/2017  Discharge date:  1/19/2017     Admitting Diagnosis:  Acute respiratory failure [facility])    Discharge Diagnoses:    Problem List as of 1/19/2017  Date Reviewed: 1/19/2017          Codes Class Noted - Resolved    Sepsis ([facility].) ICD-10-CM: A41.9  ICD-9-CM: 038.9, 995.91  1/18/2017 - Present        Acute renal failure (ARF) ([place] [address].) ICD-10-CM: N17.9  ICD-9-CM: 584.9  1/18/2017 - Present        Acute deep vein thrombosis (DVT) of right lower extremity ([place] [address].) ICD-10-CM: I82.401  ICD-9-CM: 453.40  1/16/2017 - Present    Overview Signed 1/16/2017  4:05 PM by Jaciel Morfin NP     1/13/16 Ultrasound:    1. Positive for deep venous thrombosis of the right peroneal vein. 2. No evidence of deep venous thrombosis of the left lower extremity. Bacteremia due to Gram-positive bacteria ICD-10-CM: A49.9  ICD-9-CM: 790.7  1/16/2017 - Present        Hypernatremia ICD-10-CM: E87.0  ICD-9-CM: 276.0  1/14/2017 - Present        Anemia ICD-10-CM: D64.9  ICD-9-CM: 285.9  1/14/2017 - Present        Acute respiratory failure with hypoxia (HCC) ICD-10-CM: J96.01  ICD-9-CM: 518.81  1/13/2017 - Present        Anoxic brain injury ([facility] ICD-10-CM: G93.1  ICD-9-CM: 348.1  1/13/2017 - Present        Hypotension ICD-10-CM: I95.9  ICD-9-CM: 458.9  1/13/2017 - Present        Hypoxemia ICD-10-CM: R09.02  ICD-9-CM: 799.02  1/13/2017 - Present        * (Principal)CAP (community acquired pneumonia) ICD-10-CM: J18.9  ICD-9-CM: 486  1/13/2017 - Present        RESOLVED: Renal failure ICD-10-CM: N19  ICD-9-CM: 036  1/14/2017 - 1/18/2017        RESOLVED: UTI (urinary tract infection) ICD-10-CM: N39.0  ICD-9-CM: 599.0  1/14/2017 - 1/18/2017              Consultants:  [address] [place] [address]    Studies/Procedures:  CXR  LE ultrasound:  1. Positive for deep venous thrombosis of the right peroneal vein. 2. No evidence of deep venous thrombosis of the left lower extremity.     Condition on Discharge: guarded    Disposition:  [facility] course:  80 y.o. brought to the ER by EMS after son noted during phone conversation that she was not responding / functioning normally. When he arrived from [place] the house was locked and the police who had to break down the doors. Patient was found lying half off the bed with her severely mentally handicapped daughter Abraham Guaman in the bed next to the patient. She was unresponsive, O2 sat t in the 70s, was nasally intubated and brought to the ER. Son reports that patient takes no prescription medications and is not aware of any underlying health issues. WBC is elevated at 14.4, CXR with LLL infiltrate, HR in the 120-130s, and initially hypotensive with BP in the 80/50s which has improved with IVF. Lactic acid 6.7. Was admitted to the ICU on vent support and placed on double antibiotics. Blood culture 1/2 with GPC and repeat blood cultures with no growth. Was given D5W for hypernatremia. LE with DVT and placed on therapeutic Lovenox. Palliative Care was consulted to assist with goals of care. Was able to be weaned, extubated to NC and moved to the the floor. She had no significant improvement and the decision was made to proceed with comfort care only. Hospice has been presented to patient and family and they have decided to go hospice house for end of life care. Transport will be arranged and they will be discharged today. Physical Exam:  Constitution:  the patient is thin, elderly and in no acute distress  EENMT:  Sclera clear, pupils equal, oral mucosa moist, lips and tongue dry  Respiratory: scattered crackles  Cardiovascular:  RRR without M,G,R  Gastrointestinal: soft and non-tender; with positive bowel sounds. Musculoskeletal: warm without cyanosis. There is no lower leg edema.   Skin:  no jaundice or rashes, no wounds   Neurologic: no gross neuro deficits     Psychiatric:  Awakens, smiles at times, appears to make eye contact    Discharge Medications: per hospice house physicians      Followup/Outpt Studies:  --No follow up appointments will be made. Care to be assumed by hospice physicians. --Total discharge greater than 30 minutes in duration. More than 50% of the time documented was spent in face-to-face contact with the patient and in the care of the patient on the floor/unit where the patient is located. Chad Mayorga NP        Respiratory: coarse breath sounds with decreased effort. Cardiovascular: RRR , + holosystolic murmur  Other      I have spoken with and examined the patient. I have reviewed the history, examination, assessment, and plan and agree with the above. Yanely Loja MD      This note was signed electronically.

## 2017-01-19 NOTE — HOSPICE
Met with family at bedside and discussed hospice philosophy, care, and services. Family on board with comfort care measures. Chart and assessment discussed with Dr. Tracie Queen and patient approved for [facility] transfer today.   Thank you for this referral.  Mera Mcclelland RN  [place] Nurse Liaison  [facility]  [phone]

## 2017-01-19 NOTE — PROGRESS NOTES
[facility] CRITICAL CARE OUTREACH NURSE PROGRESS REPORT      SUBJECTIVE: Called to assess patient secondary to transfer out of ICU. MEWS Score: 1 (01/18/17 1748)  Vitals:    01/18/17 1200 01/18/17 1748 01/18/17 2022 01/18/17 2237   BP: 140/80 150/60 142/82 148/50   Pulse: 85 84 82 95   Resp: 20 20 24 (!) 32   Temp: 97.6 °F (36.4 °C) 97.6 °F (36.4 °C) 97.5 °F (36.4 °C) 98.1 °F (36.7 °C)   SpO2: 90% 99% 95% 91%   Weight:       Height:          LAB DATA:    Recent Labs      01/18/17   0541  01/17/17   0515  01/16/17   0430   NA  144  142  149*   K  3.0*  3.6  3.9   CL  110*  109*  117*   CO2  20*  19*  17*   AGAP  14  14  15   GLU  122*  234*  223*   BUN  64*  75*  79*   CREA  2.07*  2.52*  2.97*   GFRAA  29*  23*  19*   GFRNA  24*  19*  16*   CA  8.0*  7.6*  7.7*   MG  2.2  2.1  2.3   PHOS  2.8  2.8  4.2*   ALB  1.5*  1.5*  1.7*   TP  6.1*  5.8*  5.8*   GLOB  4.6*  4.3*  4.1*   AGRAT  0.3*  0.3*  0.4*   ALT  21  24  27        Recent Labs      01/18/17   0541  01/17/17   0515  01/16/17   0430   WBC  17.9*  18.1*  19.0*   HGB  10.5*  9.9*  8.7*   HCT  31.3*  29.8*  27.1*   PLT  132*  159  229          OBJECTIVE: On arrival to room, I found patient to be laying in bed, eyes open. Pain Assessment  Pain Intensity 1: 0 (01/18/17 1748)     Pain Intervention(s) 1: Position  Patient Stated Pain Goal: 0    ASSESSMENT:  Pt resting in bed, eyes open. Pt will track with eyes, but no command following or verbal response. Respirations tachypnic and shallow. SpO2 94%, hr 96, bp 148/50. PLAN:  Continue to follow per outreach protocol.

## 2017-01-19 NOTE — PROGRESS NOTES
Pt resting quietly in bed, HOB elevated. Pt opens eyes to sounds but minimally responsive otherwise. Lung sounds diminished, S1/S2 audible, peripheral pulses palpable, BUE cool, BLE extremities warm. Pt has IV to left hand, site clean, dry, and intact. Edema noted to LUe and BLE. Boots in place to BLE. Pt has mckeon draining clear yellow urine to bag at bedside. No s/s of acute distress noted. Will monitor.

## 2017-01-19 NOTE — PROGRESS NOTES
Palliative Care Progress Note    Patient: Milton De Los Santos MRN: [id number]  SSN: [id number]    YOB: 1930  Age: 80 y.o. Sex: female       Assessment/Plan:     Chief Complaint/Interval History: Pt requiring more oxygen support overnight. She is on optiflow. Opens eyes, but otherwise unresponsive. Principal Diagnosis:    · Altered Mental Status R41.82-minimally responsive    Additional Diagnoses:   · Acute Respiratory Failure, Unspecified  J96.00-was able to be extubated, but oxygenation worsening; is now DNR and will not go back on ventilator. · Failure to Thrive  R62.7-unable to take in PO nutrition/hydration due to level of consciousness. · Fatigue, Lethargy  R53.83  · Frailty  R54  · Counseling, Encounter for Medical Advice  Z71.9-see below  · Encounter for Palliative Care  Z51.5    Palliative Performance Scale (PPS)  PPS: 20 (Simultaneous filing. User may not have seen previous data.)    Medical Decision Making:   Reviewed and summarized notes over previous 24 hours. Discussed case with appropriate providers: Cheyenne Friday, Dr. Akil Rosales. Reviewed laboratory and x-ray data: CBC, CMP    [facility]: [phone]    Spoke with son via telephone. He and sister in law will be here in the next few hours. He has been working to care for his mentally handicapped sister of whom pt was the caregiver. Son is aware that we need to discuss goals of care, specifically, comfort measures. Discussed with Dr. Akil Rosales who is available to speak with family as well. Returned to room to meet with pt son and sister in law. Explained pt current health situation, recommendation of comfort measures. Family states they do not feel pt would want artificial nutrition and hydration. Decision made for comfort measures. Dr. Akil Rosales to speak with family. Family is aware that pt pneumonia has worsened on abx and abx will be dc'd. Hospice consulted for evaluation for [facility].     Provided support to patient son and sister in law who are here trying to sort out the care for both the patient and patients special needs daughter. Will discuss findings with members of the interdisciplinary team.       More than 50% of this 35 minute visit was spent counseling and coordination of care as outlined above. Subjective:     Review of Systems:  Review of systems not obtained due to patient factors: poorly responsive     Objective:     Visit Vitals    /61    Pulse 99    Temp 98.1 °F (36.7 °C)    Resp 16    Ht 5' 4\" (1.626 m)    Wt 68.7 kg (151 lb 6.4 oz)    SpO2 99%    BMI 25.99 kg/m2       Physical Exam:    General:  Frail and debilitated appearing. Cachectic; dried bloody exudate to oral mucosa. Eyes:  Conjunctivae and corneas clear. Nose: Nares normal. Septum midline. Neck: Supple, symmetrical, trachea midline. Lungs:   Diminished and faintly coarse bilaterally, unlabored. Heart:  Irregular rate/rhythm; +murmur   Abdomen:   Soft, non-tender, non-distended. Extremities: Normal, atraumatic, no cyanosis. Skin: Skin fragile with scattered ecchymosis. No rash. Neurologic: Minimally responsive. Opens eyes only. Does not follow commands   Psych: Unable to assess.      Signed By: Torrey Mayorga NP     January 19, 2017

## 2017-01-20 NOTE — PROGRESS NOTES
Situation:  Patient comes to us from [facility]. According to the  patient was found unresponsive in her home after her son called and didn't get an answer. He had someone break into the house. Patient was in bed with her [place] 82 Daughter. Patient was transferred to [facility]. She has a diagnosis of Respiratory Failure and hypoxia. . She is GIP level of care for symptom management. Her symptoms are dyspnea and pain. Background:  Ms. Carlisle Bence is origionally from [place]. She moved here in 1983. She is a devout [employer] and has been involved in [employer] for many years. She attends [employer]. Wendy Chakraborty is her . Patient was living at home and had been active up until her recent turn of events. She has been caring for her disabled daughter for 48 years. Her son time lives in [place]. . Patient also has another son, Any Velazquez. He and his wife Tori Noriega are no longer together, however, she is involved with the family. Assessment:  Patient appears to be working very hard to breathe. She opened her eyes but did not speak.  offered support with the ministry of presence, compassion, kind words and prayer.  then contacted patient's son Genevive Riedel. He has been at his mom's house looking for end of life documents and instructions. He found his mom had everything planned. She had made arrangements with Lamberto Beck. Genevive Riedel would like to use the [facility] location. Teofilo sounds like he is coping effectively although he is still processing the recent happenings with mom. Genevive Riedel has also been working on arrangements for his disabled sister. He has temporarily worked out a plan and will continue to work toward a long term arrangement.  alerted [facility]EL [employer] of the nursing home information.  left a message for [facility] MSW.  offered support with compassion , active listening and assurance of prayer. Bereavement:   At this point Jodeane Or is processing the most recent events, however he sounds like he is coping effectively. Plan of Care:  to provide spiritual and emotional support throughout patient's stay as needed, requested or referred.

## 2017-01-20 NOTE — PROGRESS NOTES
Follow up on prn medication, pt resting in bed with eyes closed, FLACC =0, no s/sx of dyspnea, agitation or n/v. Will continue to monitor.

## 2017-01-20 NOTE — HSPC IDG CHAPLAIN NOTES
Patient: Tere Turner    Date: 01/20/17  Time: 2:52 PM    [facility]  Notes    During visit today patient was obtunded. No family present.          Signed by: Iza Dotson

## 2017-01-20 NOTE — PROGRESS NOTES
Pt arrived via EMS; no family present at time of arrival.   Pt skin assessed and noted blister to right lateral foot. Scattered ecchymosis with edema and a lot of bruising to right arm. Allevyn x 2 to sacrum with breakdown noted in two different places. Boggy heels. Pt mouth very dry. Arriving on 10 L HF O2. Pt will track with eyes and mouths words.

## 2017-01-20 NOTE — H&P
History and Physical    Patient: Noris Shannon MRN: [id number]  SSN: [id number]    YOB: 1930  Age: 80 y.o. Sex: female      Subjective:      Noris Shannon is a 80 y.o. female who, living at home with her disabled daughter (Down's syndrome) was found to be unresponsive and required intubation in the home.  After many days of ventilatory assistance her multiple pulmonary infiltrates seemed to stabilize and she was extubated however she has not really awoken.  She is bed bound, not eating or drinking and having mild tachypnea and respiratory distress.  The attending physicians feel that aggressive additional measures would be useless in the family desires comfort measures.  Efforts are being made to look after the disabled daughter. There is no family present and no additional history can be obtained at this time. No past medical history on file. No past surgical history on file. No family history on file. Social History   Substance Use Topics    Smoking status: Not on file    Smokeless tobacco: Not on file    Alcohol use Not on file      Prior to Admission medications    Not on File        No Known Allergies    Review of Systems: The remainder of the admission historical database is as outlined in the Clinical Record. Objective:     Vitals:    01/19/17 1805 01/20/17 0448   BP: 138/71 173/66   Pulse: (!) 101 (!) 102   Resp: 24 22   Temp: 97.6 °F (36.4 °C) 96.8 °F (36 °C)        Physical Exam: vital signs are stable as outlined. Skin examination reveals some iatrogenic trauma. Head and neck examination reveals lots of old blood and irritation in the oropharynx. Thoracic examination reveals rales and rhonchi predominantly on the right side with no wheezing and mild tachypnea. Cardiac examination reveals a harsh ejection systolic murmur radiating to the carotids but with well preserved carotid upstrokes. Abdominal examination reveals no palpable masses or tenderness.   Extremities reveal the best osteophytic changes anything unstable joints. Peripheral vascular examination revealed mild edema with no varicosities or tenderness and no peripheral ischemic changes. Mental status examination is patient to be arousable but confused and disoriented and pleasant. Neurologic examination reveals no focal lateralizing other modalities of the cranial nerves syndesmosis or reflexes. Assessment:     Hospital Problems  Date Reviewed: 1/19/2017          Codes Class Noted POA    * (Principal)Acute hypoxemic respiratory failure (HCC) ICD-10-CM: J96.01  ICD-9-CM: 518.81  1/19/2017 Unknown        CAP (community acquired pneumonia) ICD-10-CM: J18.9  ICD-9-CM: 998  1/13/2017 Yes              Plan:     Current Facility-Administered Medications   Medication Dose Route Frequency    sodium chloride (NS) flush 3 mL  3 mL IntraVENous PRN    acetaminophen (TYLENOL) suppository 650 mg  650 mg Rectal Q3H PRN    acetaminophen (TYLENOL) tablet 650 mg  650 mg Oral Q4H PRN    loperamide (IMODIUM) capsule 4 mg  4 mg Oral PRN    senna (SENOKOT) tablet 8.6 mg  1 Tab Oral BID    hyoscyamine (LEVSIN) 0.125mg/mL solution 250 mcg  250 mcg Oral Q4H PRN    glycopyrrolate (ROBINUL) injection 0.2 mg  0.2 mg SubCUTAneous Q4H PRN    morphine injection 4 mg  4 mg IntraVENous Q1H PRN    Or    morphine (ROXANOL) concentrated oral syringe 10 mg  10 mg Oral Q1H PRN    Or    morphine injection 4 mg  4 mg SubCUTAneous Q1H PRN    LORazepam (ATIVAN) injection 2 mg  2 mg IntraVENous Q2H PRN    Or    LORazepam (ATIVAN) injection 2 mg  2 mg IntraMUSCular Q2H PRN    lidocaine (XYLOCAINE) 2 % viscous solution 5 mL  5 mL Mouth/Throat Q3H PRN       I have reviewed the situation with staff. Despite her apparent adequate state of health prior to this illness, she appears to be dying from this episode of respiratory failure. We will provide for comfort care with regards to pain and breathing status.   I would anticipate that she will die within the week. In the unlikely event of her reestablishing adequate alimentation and respiration, we would reassess her transition status. .      Signed By: Ashwini Baron MD     January 20, 2017

## 2017-01-20 NOTE — HSPC IDG VOLUNTEER NOTES
[address] Review     Status Codes I = Initiated C=Continued R=Revised RS = Resolved     I.  Volunteer     Goal: Hospice house volunteer (s) enhances the quality of remaining life while patient is at the hospice house. Interventions: Leoncio Baeza Volunteer (s) will provide companionship to the patient and/or family by visiting at the hospice house       . Leoncio Baeza Volunteer (s) will provide respite as needed when requested by patient and/or family. Leoncio Nichole  Volunteer will provide activities such as music, reading, pet therapy, etc. as requested. Leoncio Nichole  Comfort bag delivered. Any other special requests or information regarding volunteer services:    Staff have requested extra visits for companionship and support to the pt's children. Volunteers have been notified. No further needs identified at this time. These notes have been discussed in [facility] meeting.           Signed by: Etta Manzano

## 2017-01-20 NOTE — HSPC IDG SOCIAL WORKER NOTES
Patient: Nolan Thompson    Date: 01/20/17  Time: 2:44 PM    [facility]  Notes  LMSW did complete the initial assessment. Son came down from [place] and had to have the police break in her door. They found her with her 50year old daughter who has Down's syndrome laying in bed with her. Son is working on legal issues. The son is getting help for his sister. No information to be given to [employer].       Signed by: Silvana Perez

## 2017-01-20 NOTE — PROGRESS NOTES
Pt grimacing, pt non-verbal. Pt mouth dry, reddened and bloody. Administered morphine sub q for pain and swabbed pt mouth with lidocaine, pt reacted by turning away and jerking when mouth swabbed with lidocaine.  Pt following with her eyes but non-verbal

## 2017-01-20 NOTE — PROGRESS NOTES
Hospice Psychosocial Initial Assessment    1/20/2017    Chad Jang  11/7/1930  [id number]  [id number]    [place] Status:     Type of Assessment: Initial Evaluation    Discipline of person completing the assessment: [facility]    Disease Process  Principal Problem:    Acute respiratory failure with hypoxia ([address] (1/13/2017) POA: Yes    Active Problems:    Anoxic brain injury ([address] (1/13/2017) POA: Yes      Acute deep vein thrombosis (DVT) of right lower extremity ([address] (1/16/2017) POA: Yes      Overview: 1/13/16 Ultrasound:        1. Positive for deep venous thrombosis of the right peroneal vein. 2. No evidence of deep venous thrombosis of the left lower extremity. Sepsis ([facility] (1/18/2017) POA: Yes      Acute hypoxemic respiratory failure ([address] (1/19/2017) POA: Unknown        Individuals participating in interview//assessment process (name and relationship): Rosa Pena Son, patient    Support System  If in a relationship, name of partner/spouse? Duration of relationship:   Does the spouse/partner function as the primary caregiver? NO    Members of the household  Does the patient live alone: NO  Members of the household  Name Age Relationship Actively Involved in Care   Daughter  50  No has Down's syndrome                             Notes Pt was at home caring for her daughter. Family members/significant others not part of the household  Name Age Relationship Actively Involved in [address] [facility], from [facility]   Sister in law no   Edgar Hargrove  Son  NO INFORMATION TO                  Notes Pt has 3 children. Rosa Pena is POA, Brother Edgar no information to him PER NICKY, and his sister with Down's syndrome. (no name given)  Travis Garg is Nicky's ex sister in law and is named in the will. She is helping Rosa Pena.         Social Support System: Fair social support which includes one willing family member or friend    Abuse/Neglect (actual/potential risks): no    Mental Status: Alert; Oriented to  Person  Does the patient or family have any concerns about the patient's mental status? NO    Emotional Status  Patient: mood/affect stable and appropriate  Caregiver: mood/affect stable and appropriate, apprehensive and suspicious    Significant Behavioral Changes Noted with members of the household other than the patient: other (Victor M Christy was suspicious of me and why I wanted information on his siblings.  )    Leisure time management/hobbies/interests: listening to music    Financial/Employment Status  Current employment status:   Has the patient's illness/condition forced a change in his/her employment status? NO    Has the patient's illness/condition forced a change in the employment status of the spouse or significant other? NO    Patient's annual income level: did not discuss  Has the patient's income status changed as a result of health status: NO  Financial needs: NO  The patient needs the following services: [employer] and Other  home information  Handling finances: Total assistance unable to manage his/her own financial affairs  Financial concerns expressed by patient or spouse/significant other:   Financial/employment notes: Victor M Christy is going to a  today     Goals/Plans  Goals of hospice care expressed by patient: none non verbal  Goals of hospice care expressed by spouse/significant other: Victor M Christy is concerned about his sister and the help she needs    Pain Management  Does the patient/family express or observed signs/symptoms of any pain/issues that need to be reported to the ? NO  If yes, what time was the  notified?      End of Life Decisions/Planning: Advanced Directives and Durable Medical Power of [place]orn[place]  Status of plans for /final arrangements: Plans complete  Plans/desires/requests for final arrangements/ communicated with: Family,  home and Other (Victor M Christy is researching her paperwork now for her plans.)  End of life notes: Burial in [place]  Remaining life goals: comfort    Survivor Risk Assessment  Indicate the actual and potential risks to the bereavement process: concurrent stressful events or situations observed or reported, patient is a single-parent with dependent children, social support systems limited, survivor showing emotional and/or physical signs of stress/distress and other (Rosemary Monge is concerned with obtaining care for his sister who had Down's syndrome and taking care of his mother's arrangements. )  Risk notes: Moderate risk due to the sibling needs     Is spiritual well being essential to patient/caregiver? Asked and discussion occurred   Spiritual notes: she loves janeth Son says she is a Denominational,  We would need to speak to her friends to find out a Anglican. Narrative :  LMSW visited with the pt this am.  She was in bed eyes open and breathing fast.  LMSW sat with her and even though she could not answer verbally. She was able to communicate with eyes and head nods. She indicated she was having pain by a head nod. Rn came in and did mouth care. Her mouth is so very dry. LMSW called her son Rosemary Monge for more information. In speaking toTIm, I learned pt is a [place] and loves janeth. She was caring for her dependant daughter who is 50years old an dhas Down's Syndrome. Pt was found half on half off of her bed with her daughter Iglesia Valdez next to her. They do not know how long she was like that. Her son drove down from [address] and had the police break in the door to get to her. Rosemary Monge the son is going to see a  today to make legal decisions. He is finding information from his mothers files in the home. Rosemary Monge does have a Brother that he wants no information given to [employer]. Family i sunsure of  homes here in [place], but he knows her final resting place is [place]. He will get back to me when he knows.        Bee Velasco

## 2017-01-20 NOTE — HSPC IDG CHAPLAIN NOTES
Patient: Nubia Holden    Date: 01/20/17  Time: 2:43 PM    [facility]  Notes  Assessment pending for spiritual and bereavement care.          Signed by: Darien Perez

## 2017-01-20 NOTE — PROGRESS NOTES
Pt had an uneventful day, she was medicated x 1 with morphine and mouth swabbed with lidocaine for bloody, reddened, dry mouth. Pt slept most of the day unless turned, pt was non-verbal but grimaces when turned, attempts at mouth care are met with grimacing and turning away from nurse.

## 2017-01-21 NOTE — PROGRESS NOTES
Assessment, ESAS, fall risk and dimas completed without her assistance or resistance. She does not attempt to assist, follow commands or verbally interact without encouragement. Unaccompanied. Repositioned. All considerations for level III fall risk initiated/continue. SRs up x 2. Call bell within reach. x__Assess for environmental safety  _x_Ensure frequently used items are in reach of the patient  _x_Place bed in lowest position with wheel locked  __Check footwear for slip resistance and proper fit  _x_Assess effect to patient with medication changes, diuretic and narcotic use  x__Assess need for equipment: bedside commode, urinal, bedpan, etc.  x__Assess and managed pain and symptoms  _x_Assess patients cognition and ability to follow instructions. [place] patient to surroundings.   x__Educate patient and family related to safety techniques  _x_Provide lighting at all times  __Use floor mats while patient in bed  x__Use tab or bed alarm at all times  _x_Position patient for comfort  _x_Assess fall trends  x__Assess for need of physical therapy  _x_Assess need of volunteer or family to sit with patient  x__Evaluate need for change in medications and other treatments  x__Encourage family to provide close supervision while transferring, ambulation, toileting, etc and to request assistance as needed  _x_Assess the need for patient to be close to nurses station  _x_Leave patient door open while unattended by family or staff  _x_Educate patient / family of no restraint policy and interventions to promote safety

## 2017-01-21 NOTE — PROGRESS NOTES
Rimma Saab returns the call, is notified of her passing at [phone] am.  He requests time to speak with his family, and then will call me back with a plan. He asks appropriate questions and is appreciative of her care.

## 2017-01-21 NOTE — PROGRESS NOTES
Gagan here for transference of care. Family remains for meeting with [facility] staff. Family removes all belongings.

## 2017-01-21 NOTE — PROGRESS NOTES
Attempt at oral care but she does not tolerate it. Visitor in the room expresses concern over her spiritual care, and is reassured we support every aspect of her care, including spiritual care. He asks multiple questions, multiple times regarding her diagnosis, condition and treatment. He does not have the privacy code and is reassured that she is receiving the care she needs. He is encouraged to speak with her family. Pt does not respond to conversation in the room. FLACC 0.  RR 16, dry and even.

## 2017-01-21 NOTE — PROGRESS NOTES
Patient in bed, awake. Patient moving mouth but words are not audible. Nurse asked patient was she hurting and patient nodded 'yes\". When asked where was pain, patient stated,  \"mouth. \"  Roxanol prn pain given. Patient's  Mouth swabbed very little with lodicaine before patient refused.

## 2017-01-21 NOTE — HSPC IDG MASTER NOTE
Hospice Interdisciplinary Group Collaborative  Date: 01/20/17  Time: 7:09 PM    ___________________    Patient: Milton De Los Santos      1st IDG: Family issues-Pt is mother of downs syndrome daughter. Lidocaine for mouth care. Pt has been obtunded but woke up this am and requested water. Plan: Monitor and Symptom management and continuous evaluation to achieve optimum comfort.     ___________________    Diagnoses:  Diagnoses of Acute hypoxemic respiratory failure ([address] and CAP (community acquired pneumonia) were pertinent to this visit. Current Medications:    Current Facility-Administered Medications:     sodium chloride (NS) flush 3 mL, 3 mL, IntraVENous, PRN, Savannah Weiss MD    acetaminophen (TYLENOL) suppository 650 mg, 650 mg, Rectal, Q3H PRN, Savannah Weiss MD    acetaminophen (TYLENOL) tablet 650 mg, 650 mg, Oral, Q4H PRN, Savannah Weiss MD    loperamide (IMODIUM) capsule 4 mg, 4 mg, Oral, PRN, Savannah Weiss MD    [facility]) tablet 8.6 mg, 1 Tab, Oral, BID, Savannah Weiss MD, Stopped at 01/20/17 9774    hyoscyamine (LEVSIN) 0.125mg/mL solution 250 mcg, 250 mcg, Oral, Q4H PRN, Savannah Weiss MD    glycopyrrolate (ROBINUL) injection 0.2 mg, 0.2 mg, SubCUTAneous, Q4H PRN, Savannah Weiss MD    morphine injection 4 mg, 4 mg, IntraVENous, Q1H PRN **OR** morphine (ROXANOL) concentrated oral syringe 10 mg, 10 mg, Oral, Q1H PRN, 10 mg at 01/19/17 2031 **OR** morphine injection 4 mg, 4 mg, SubCUTAneous, Q1H PRN, Savannah Weiss MD, 4 mg at 01/20/17 0934    LORazepam (ATIVAN) injection 2 mg, 2 mg, IntraVENous, Q2H PRN **OR** LORazepam (ATIVAN) injection 2 mg, 2 mg, IntraMUSCular, Q2H PRN, Savannah Weiss MD    lidocaine (XYLOCAINE) 2 % viscous solution 5 mL, 5 mL, Mouth/Throat, Q3H PRN, Sanket Benjamin NP, 5 mL at 01/20/17 0933    Orders:       Allergies:  No Known Allergies    ___________________    Care Team Notes          POC/IDG Notes      HSPC IDG Volunteer Notes by Elissa Rice at 01/20/17 1503  Version 1 of 1    Author:  Meghana Shirley Service:  Inga Castillo Author Type:  Hospice Volunteer/    Filed:  01/20/17 1504 Date of Service:  01/20/17 1503 Status:  Signed    :  Meghana Shirley (Hospice Volunteer/)               [facility] Interdisciplinary Plan of Care Review     Status Codes I = Initiated C=Continued R=Revised RS = Resolved     I.  Volunteer     Goal: Hospice house volunteer (s) enhances the quality of remaining life while patient is at the hospice house. Interventions: Aparna Quiñonez [employer] Volunteer (s) will provide companionship to the patient and/or family by visiting at the hospice house       . Aparna Riser [employer] [place] Volunteer (s) will provide respite as needed when requested by patient and/or family. Aparna Riser Vic Shon  Volunteer will provide activities such as music, reading, pet therapy, etc. as requested. Aparna Riser Vic Shon  Comfort bag delivered. Any other special requests or information regarding volunteer services:    Staff have requested extra visits for companionship and support to the pt's children. Volunteers have been notified. No further needs identified at this time. These notes have been discussed in [facility] meeting. Signed by: Meghana Shirley       [facility] IDG  Notes by Christophe Sam at 01/20/17 1452  Version 1 of 1    Author:  Christophe Sam Service:  Spiritual Care Author Type:  Pastoral Care    Filed:  01/20/17 1455 Date of Service:  01/20/17 1452 Status:  Signed    :  Christophe Sam (Pastoral Care)           Patient: Arianna Palomo    Date: 01/20/17  Time: 2:52 PM    [facility]  Notes    During visit today patient was obtunded. No family present.          Signed by: Christophe Sam       [facility] IDG  Notes by Navya Gomez at 01/20/17 4484  Version 1 of 1    Author:  Navya Gomez Service:  Inga Castillo Author Type:      Filed:  01/20/17 1453 Date of Service:  01/20/17 1446 Status:  Signed    :  Jessi Mariscal ()           Patient: Nii Jose    Date: 01/20/17  Time: 2:44 PM    [facility]  Notes  LMSW did complete the initial assessment. Son came down from [place] and had to have the police break in her door. They found her with her 50year old daughter who has Down's syndrome laying in bed with her. Son is working on legal issues. The son is getting help for his sister. No information to be given to [employer]. Signed by: Jessi Mariscal       [facility] IDG  Notes by Vlad Phillip at 01/20/17 1443  Version 1 of 1    Author:  Vlad Phillip Service:  Spiritual Care Author Type:  Pastoral Care    Filed:  01/20/17 1444 Date of Service:  01/20/17 1443 Status:  Signed    :  Vlad Phillip (Pastoral Care)           Patient: Nii Jose    Date: 01/20/17  Time: 2:43 PM    [facility]  Notes  Assessment pending for spiritual and bereavement care.          Signed by: Vlad Phillip

## 2017-01-21 NOTE — PROGRESS NOTES
Patient in bed resting quietly, patient opened eyes to verbal stimulation, but is very lethargic. Patient tracking with eyes, aware of nurse's presence. When explained to patient that nurse was going to listen to her heart and stethoscope might feel cold, patient nodded her head. Patient has extremely dry mouth, with dry blood throughout and on teeth. No s/sx of pain, nausea/vomiting, anxiety, agitation or respiratory distress present. Patient is on [facility], . Lungs sounds clear, hypoactive bowel sounds, abdomen soft and flat. Edema noted in all extremities including hands. Upper extremities are cool to the touch, skin is pale with spider veins in upper thighs and lower legs to feet. Right heel has large deep red/purple blister and heels are boggy, floated on pillow. Stage two pressure ulcer to right butt and two stage 2 pressure  ulcers to left butt. Bilateral radial and pedal pulses are palpable. Vargas is patent and draining yellow/straw urine with some sediment. Rails up x 2, bed locked and lowered. Patient is alone.

## 2017-01-21 NOTE — PROGRESS NOTES
ID, bedside report. Restful. RR 18, dry  Unaccompanied. She does open her eyes to brisk verbal stimulation. She mumbles but I cannot make out even one word.   FLACC 0

## 2017-01-21 NOTE — PROGRESS NOTES
NATALIA assisted with follow up with patient son Chaz Pimentel [phone] after two hours to see if he was going to come and he informed SW he was in route and was a hour away that his sister in-law Vincent Pond should be there shortly. NATALIA spent time with Gera Rodriguez who was grieving appropriately and she shared life stories with NATALIA. Patient son arrived and patient daughter who has down syndrome and went with them into the room and provided supportive counseling. Patient daughter said she was fine and wanted SW to comfort Brenda Natarajan who was crying. SW provided a prayer and hugs. Gera Rodriguez informed NATALIA that Brenda Natarajan was not a [employer] but the rest of the family was and they knew where patient was in [facility] and they were joyful. Patient was caregiver for her daughter and she will now go and live with Gera Rodriguez and her  in [place]. Brenda Natarajan may need some extra bereavement support and SW will relate this to [employer].

## 2017-01-21 NOTE — PROGRESS NOTES
Progress Note    Patient: Mell Willingham MRN: [id number]  SSN: [id number]    YOB: 1930  Age: 80 y.o. Sex: female      Admit Date: 1/19/2017    LOS: 2 days     Clinical Summary:     I walked into the patient's room this morning and she appeared to be lifeless. She is held with no palpable or auscultatory pulse and no signs of spontaneous respiration or neurologic activity. Vitals:    01/19/17 1805 01/20/17 0448 01/20/17 1634 01/21/17 0418   BP: 138/71 173/66 144/66 135/90   Pulse: (!) 101 (!) 102 97 (!) 103   Resp: 24 22 18 19   Temp: 97.6 °F (36.4 °C) 96.8 °F (36 °C) 96.3 °F (35.7 °C) 96.6 °F (35.9 °C)            Clinical Assessment:     Principal Problem:    Acute hypoxemic respiratory failure (HCC) (1/19/2017)    Active Problems:    CAP (community acquired pneumonia) (1/13/2017)        Treatment Plan:      I have reviewed the patient's Plan of Care with the nursing staff. no family is present and bypass information on to the staff nurse.           Current Facility-Administered Medications   Medication Dose Route Frequency    sodium chloride (NS) flush 3 mL  3 mL IntraVENous PRN    acetaminophen (TYLENOL) suppository 650 mg  650 mg Rectal Q3H PRN    acetaminophen (TYLENOL) tablet 650 mg  650 mg Oral Q4H PRN    loperamide (IMODIUM) capsule 4 mg  4 mg Oral PRN    senna (SENOKOT) tablet 8.6 mg  1 Tab Oral BID    hyoscyamine (LEVSIN) 0.125mg/mL solution 250 mcg  250 mcg Oral Q4H PRN    glycopyrrolate (ROBINUL) injection 0.2 mg  0.2 mg SubCUTAneous Q4H PRN    morphine injection 4 mg  4 mg IntraVENous Q1H PRN    Or    morphine (ROXANOL) concentrated oral syringe 10 mg  10 mg Oral Q1H PRN    Or    morphine injection 4 mg  4 mg SubCUTAneous Q1H PRN    LORazepam (ATIVAN) injection 2 mg  2 mg IntraVENous Q2H PRN    Or    LORazepam (ATIVAN) injection 2 mg  2 mg IntraMUSCular Q2H PRN    lidocaine (XYLOCAINE) 2 % viscous solution 5 mL  5 mL Mouth/Throat Q3H PRN           Signed By: Chandni Hernández, MD     January 21, 2017

## 2017-01-22 PROCEDURE — 0656 HSPC GENERAL INPATIENT

## 2017-01-23 LAB — LACTATE BLD-SCNC: 6.7 MMOL/L (ref 0.5–1.9)

## 2017-01-23 PROCEDURE — 0656 HSPC GENERAL INPATIENT

## 2017-01-24 PROCEDURE — 0656 HSPC GENERAL INPATIENT

## 2017-02-02 NOTE — HSPC IDG BEREAVEMENT NOTES
Patient death and family bereavement needs discussed at [facility]. Bereavement risk factors indicate a bereavement risk score of MODERATE . Bereavement follow up to be provided accordingly.